# Patient Record
Sex: FEMALE | Race: WHITE | NOT HISPANIC OR LATINO | Employment: OTHER | URBAN - METROPOLITAN AREA
[De-identification: names, ages, dates, MRNs, and addresses within clinical notes are randomized per-mention and may not be internally consistent; named-entity substitution may affect disease eponyms.]

---

## 2017-01-04 PROBLEM — N20.0 NEPHROLITHIASIS: Status: ACTIVE | Noted: 2017-01-04

## 2017-03-10 PROBLEM — J45.20 MILD INTERMITTENT ASTHMA WITHOUT COMPLICATION: Status: ACTIVE | Noted: 2017-03-10

## 2017-09-10 ENCOUNTER — PATIENT MESSAGE (OUTPATIENT)
Dept: OBSTETRICS AND GYNECOLOGY | Facility: CLINIC | Age: 27
End: 2017-09-10

## 2018-01-29 PROBLEM — K21.9 GERD (GASTROESOPHAGEAL REFLUX DISEASE): Status: ACTIVE | Noted: 2018-01-29

## 2018-01-29 PROBLEM — N20.0 NEPHROLITHIASIS: Status: RESOLVED | Noted: 2017-01-04 | Resolved: 2018-01-29

## 2018-05-11 DIAGNOSIS — N64.4 BREAST PAIN: Primary | ICD-10-CM

## 2018-09-06 ENCOUNTER — TELEPHONE (OUTPATIENT)
Dept: PHARMACY | Facility: CLINIC | Age: 28
End: 2018-09-06

## 2018-09-06 PROBLEM — M47.812 CERVICAL FACET SYNDROME: Status: ACTIVE | Noted: 2018-09-06

## 2018-09-12 NOTE — TELEPHONE ENCOUNTER
DOCUMENTATION ONLY  The prior authorization request for Aimovig was denied by the patient's insurance.   Forwarded to the clinical pharmacist for review. ARR 11:43am

## 2018-09-21 ENCOUNTER — PATIENT MESSAGE (OUTPATIENT)
Dept: ADMINISTRATIVE | Facility: OTHER | Age: 28
End: 2018-09-21

## 2018-10-09 NOTE — TELEPHONE ENCOUNTER
DOCUMENTATION ONLY:  Appeal for Aimovig approved from 10/0+/2018 to 01/06/2019  Case ID: [id number]   Co-pay: $3.00  Patient Assistance IS NOT required  JUNO

## 2018-11-06 ENCOUNTER — TELEPHONE (OUTPATIENT)
Dept: PHARMACY | Facility: CLINIC | Age: 28
End: 2018-11-06

## 2018-11-08 ENCOUNTER — TELEPHONE (OUTPATIENT)
Dept: PHARMACY | Facility: CLINIC | Age: 28
End: 2018-11-08

## 2018-11-08 NOTE — TELEPHONE ENCOUNTER
Initial Aimovig consult declined on  . Aimovig 140mg will be shipped on  to arrive at patient's home on  via [employer]. $ 0 copay and permission to charge CC on file. Address confirmed. Confirmed 2 patient identifiers - name and . Therapy Appropriate.    --Injection experience: patient is existing to therapy      All questions answered and addressed to patients satisfaction. I will f/u with patient in 7-10 days from start, [facility] to contact patient in 3 weeks for refills.

## 2018-11-20 ENCOUNTER — TELEPHONE (OUTPATIENT)
Dept: PHARMACY | Facility: CLINIC | Age: 28
End: 2018-11-20

## 2018-11-20 NOTE — TELEPHONE ENCOUNTER
Called patient in regards to Aimovig f/u. No VM set up therefore unable to leave message. Will continue to attempt to reach patient.

## 2018-12-06 NOTE — TELEPHONE ENCOUNTER
Call attempt 3 for Aimovig refill and followup. VM not setup. Sent [employer]. Copay $0 at 004.

## 2018-12-11 NOTE — TELEPHONE ENCOUNTER
Submitted prior authorization request for Aimovig to insurance on 09/07 9:45am. ARR   Detail Level: Detailed Quality 47: Advance Care Plan: Advance Care Planning discussed and documented; advance care plan or surrogate decision maker documented in the medical record.

## 2018-12-27 ENCOUNTER — TELEPHONE (OUTPATIENT)
Dept: PHARMACY | Facility: CLINIC | Age: 28
End: 2018-12-27

## 2019-01-04 ENCOUNTER — TELEPHONE (OUTPATIENT)
Dept: PHARMACY | Facility: CLINIC | Age: 29
End: 2019-01-04

## 2019-01-04 RX ORDER — MIRTAZAPINE 15 MG/1
30 TABLET, FILM COATED ORAL NIGHTLY
COMMUNITY
End: 2019-02-21

## 2019-01-04 NOTE — TELEPHONE ENCOUNTER
During refill call, patient reported they started taking Remeron 15 mg nightly and no longer taking Trazadone 100 mg, Z-Olayinka, and Cipro 500 mg. Informed patient there are no drug interactions with the Remeron and the Aimovig. Patient verbalized understanding.    Jose Roberto Palmer, PharmD  Clinical Pharmacist   [facility]   P: [phone]

## 2019-01-04 NOTE — TELEPHONE ENCOUNTER
Patient called to refill Aimovig.  Patient confirmed she has 0 doses left, her next injection is 1/10.  Ship 1/8 for 1/9 delivery.  Copay $0.00  in 004.  Patient confirmed she started new meds, but nonew allergies, or health conditions.  Verified address.  Transferred her to [facility]

## 2019-01-31 ENCOUNTER — TELEPHONE (OUTPATIENT)
Dept: PHARMACY | Facility: CLINIC | Age: 29
End: 2019-01-31

## 2019-02-28 ENCOUNTER — TELEPHONE (OUTPATIENT)
Dept: PHARMACY | Facility: CLINIC | Age: 29
End: 2019-02-28

## 2019-04-02 ENCOUNTER — PATIENT OUTREACH (OUTPATIENT)
Dept: ADMINISTRATIVE | Facility: HOSPITAL | Age: 29
End: 2019-04-02

## 2019-04-02 ENCOUNTER — TELEPHONE (OUTPATIENT)
Dept: PHARMACY | Facility: CLINIC | Age: 29
End: 2019-04-02

## 2019-04-29 ENCOUNTER — TELEPHONE (OUTPATIENT)
Dept: PHARMACY | Facility: CLINIC | Age: 29
End: 2019-04-29

## 2019-05-28 ENCOUNTER — TELEPHONE (OUTPATIENT)
Dept: PHARMACY | Facility: CLINIC | Age: 29
End: 2019-05-28

## 2019-05-28 NOTE — TELEPHONE ENCOUNTER
RX call regarding Aimovig from [facility]. Patient reached-- name and  verified. Patient stated she was out of town when injections came in and she spoke with a pharmacist and she did not inject until . Next injection will not be due until . Will pend out accordingly.

## 2019-06-14 ENCOUNTER — TELEPHONE (OUTPATIENT)
Dept: PHARMACY | Facility: CLINIC | Age: 29
End: 2019-06-14

## 2019-07-02 ENCOUNTER — TELEPHONE (OUTPATIENT)
Dept: PHARMACY | Facility: CLINIC | Age: 29
End: 2019-07-02

## 2019-07-02 NOTE — TELEPHONE ENCOUNTER
[facility] Patient confirmed need of the refill. Will deliver via [employer] on  to arrive on  with patient consent. Copay $0.00 at 004. Address confirmed. Patient has 0 doses remaining (0 day supply)/ next injection due . Patient denies missed doses and no side effects.  No new medications/allergies/medical conditions. Labs are stable. No ER/Urgent care visits in past month. No Sharps container needed. Patient taking the medication as directed. Patient denies any further questions. Confirmed 2 patient identifiers - Name and .     Manjinder Mcrae, PharmD  Clinical Pharmacist  [facility]  P: [phone]

## 2019-07-10 NOTE — TELEPHONE ENCOUNTER
The patient called on call pharmacist 7/9 at 8:07pm to ask why her Aimovig was not delivered on 7/9. It appears when the shipment was set up in [facility], it was RTS through her insurance. We discussed that her last injection was on 6/21 therefore this month's injection should be due on 7/19. The patient stated she previously injected on the 10th of every month and would like to resume that dosing.    I apologized for the miscommunication with the original shipment and informed her we will ship her Aimovig today for her to receive tomorrow. $0 copay, address confirmed. She verbalized understanding and had no further questions/concerns.

## 2019-07-30 ENCOUNTER — TELEPHONE (OUTPATIENT)
Dept: PHARMACY | Facility: CLINIC | Age: 29
End: 2019-07-30

## 2019-07-31 NOTE — TELEPHONE ENCOUNTER
DOCUMENTATION ONLY:     Prior Authorization for Emgality submitted to patient's insurance company via fax @11:57am. EMERITA

## 2019-08-01 ENCOUNTER — TELEPHONE (OUTPATIENT)
Dept: PHARMACY | Facility: CLINIC | Age: 29
End: 2019-08-01

## 2019-08-01 NOTE — TELEPHONE ENCOUNTER
DOCUMENTATION ONLY:  Prior authorization for Emgality approved from 7/31/2019 to 7/31/2020    Case Id: [id number]    Co-pay: $0    Patient Assistance is not required. Forwarding to clinical pharmacist for consult and shipment.  EMERITA

## 2019-08-01 NOTE — TELEPHONE ENCOUNTER
Initial Emgality consult completed on 19. Emgality 240mg (loading dose) shipment is pending. $0.00 copay. Patient intends to start Emgality on  8/10 when due for next CGRP dose. Address confirmed. Confirmed 2 patient identifiers - name and . Therapy Appropriate.    Indication: Migraine prophlaxis  goals of treatment: reduction in migraine frequency, intensity, and/or duration.     --Injection experience: Yes  Informed patient on online injection video on  website - links sent via [employer] in refrigerator prior to use (do not freeze, do not shake, keep in original box until use).    Counseled patient on administration directions:  - Inject two injections (240mg) into the skin as a loading dose, followed by one injection (120mg) once every 4 weeks thereafter.   -  Advised patient to keep a calendar to stay compliant.   - Take out of the refrigerator 30 minutes prior to injection to reach room temperature.  - Wash hands before and after injection.  - Monthly RX will come with gauze, band aids, and alcohol swabs.  - Patient may self-inject in either the front/top of the thighs, abdomen- but at least 2 inches away from belly button   - If someone else is giving the injection, they may also use the outer part of your upper arm or your buttocks.   - If injecting 2 pens for the loading dose, use 2 different injection sites.   - Patient was instructed to rotate injections sites monthly.  - Inspect medication - should be clear and colorless to slightly yellow to slightly brown.   - Patient is to wipe down the injection site with the alcohol pad, wait to dry.    - Remove white base cap from pen (twist to remove).  - Place the pen flat against the injection site and turn the lock ring to the unlocked position then push down and hold the teal button - there will be an initial click; in 10 seconds you will hear a second click.  - Remove the pen from skin and check to see if the gray plunger is  visible - this will indicate that the injection is complete.   - Patient will use sharps container; once full, per state law, she/he may securely lock the sharps container and place in trash. Pharmacy will replace the sharps at no additional charge.    Patient was counseled on possible side effects:  - Injection site reaction: redness, soreness, itching, bruising, which should resolve within 3-5 days.  - Allergic reactions: itching, rash, hives, swelling of face, mouth, tongue, throat, trouble breathing.   - Informed that there is no data in pregnancy/breastfeeding.     Consultation included the importance of compliance and of keeping all follow up appointments.  Patient understands to report any medication changes to [facility] and provider. All questions answered and addressed to patients satisfaction. RPh will touchbase with pt in 7 days and [facility] will contact patient in 3 weeks for refills.    Tim Trviedi, PharmD  Clinical Pharmacist  [facility]  P: [phone]    InSean sent to provider on 8/1/19

## 2019-09-25 PROBLEM — R10.13 EPIGASTRIC PAIN: Status: ACTIVE | Noted: 2019-09-25

## 2019-09-25 PROBLEM — R19.7 DIARRHEA: Status: ACTIVE | Noted: 2019-09-25

## 2019-09-30 ENCOUNTER — TELEPHONE (OUTPATIENT)
Dept: PHARMACY | Facility: CLINIC | Age: 29
End: 2019-09-30

## 2019-10-31 ENCOUNTER — TELEPHONE (OUTPATIENT)
Dept: PHARMACY | Facility: CLINIC | Age: 29
End: 2019-10-31

## 2019-10-31 NOTE — TELEPHONE ENCOUNTER
Call attempt 1 for Emgality refill and follow-up. LVM for patient and sent [email or web address] message. $0.00 copay at 004.     Tim Trivedi, PharmD  Clinical Pharmacist  [facility]  P: [phone]

## 2019-11-04 ENCOUNTER — PATIENT MESSAGE (OUTPATIENT)
Dept: ORTHOPEDICS | Facility: CLINIC | Age: 29
End: 2019-11-04

## 2019-11-05 NOTE — TELEPHONE ENCOUNTER
Refill call for Emgality. Verified to ship on Wednesday 11/6 for delivery on Thursday 11/7. $0.00 copay at 004.     Tim Trivedi, PharmD  Clinical Pharmacist  [facility]  P: [phone]

## 2019-12-02 ENCOUNTER — TELEPHONE (OUTPATIENT)
Dept: PHARMACY | Facility: CLINIC | Age: 29
End: 2019-12-02

## 2019-12-02 PROBLEM — E04.2 NONTOXIC MULTINODULAR GOITER: Status: ACTIVE | Noted: 2019-12-02

## 2019-12-03 PROBLEM — Z83.3 FAMILY HISTORY OF DIABETES MELLITUS: Status: ACTIVE | Noted: 2019-12-03

## 2019-12-03 PROBLEM — Z83.49 FAMILY HISTORY OF THYROID DISEASE IN MOTHER: Status: ACTIVE | Noted: 2019-12-03

## 2019-12-27 ENCOUNTER — TELEPHONE (OUTPATIENT)
Dept: PHARMACY | Facility: CLINIC | Age: 29
End: 2019-12-27

## 2020-02-14 ENCOUNTER — TELEPHONE (OUTPATIENT)
Dept: PHARMACY | Facility: CLINIC | Age: 30
End: 2020-02-14

## 2020-02-18 ENCOUNTER — TELEPHONE (OUTPATIENT)
Dept: PHARMACY | Facility: CLINIC | Age: 30
End: 2020-02-18

## 2020-03-13 ENCOUNTER — TELEPHONE (OUTPATIENT)
Dept: PHARMACY | Facility: CLINIC | Age: 30
End: 2020-03-13

## 2020-07-22 PROBLEM — Z01.818 PRE-OP TESTING: Status: ACTIVE | Noted: 2020-07-22

## 2021-03-16 PROBLEM — G57.72 COMPLEX REGIONAL PAIN SYNDROME TYPE 2 OF LEFT LOWER EXTREMITY: Status: ACTIVE | Noted: 2021-03-16

## 2021-05-06 ENCOUNTER — PATIENT MESSAGE (OUTPATIENT)
Dept: RESEARCH | Facility: HOSPITAL | Age: 31
End: 2021-05-06

## 2022-03-08 ENCOUNTER — HOSPITAL ENCOUNTER (EMERGENCY)
Facility: HOSPITAL | Age: 32
Discharge: HOME | End: 2022-03-08
Attending: EMERGENCY MEDICINE
Payer: MEDICAID

## 2022-03-08 VITALS
SYSTOLIC BLOOD PRESSURE: 106 MMHG | TEMPERATURE: 99 F | RESPIRATION RATE: 18 BRPM | BODY MASS INDEX: 21.17 KG/M2 | HEIGHT: 64 IN | HEART RATE: 95 BPM | DIASTOLIC BLOOD PRESSURE: 84 MMHG | OXYGEN SATURATION: 98 % | WEIGHT: 124 LBS

## 2022-03-08 DIAGNOSIS — R10.13 EPIGASTRIC PAIN: Primary | ICD-10-CM

## 2022-03-08 PROCEDURE — 25000003 PHARM REV CODE 250: Performed by: EMERGENCY MEDICINE

## 2022-03-08 PROCEDURE — 96372 THER/PROPH/DIAG INJ SC/IM: CPT | Mod: 59 | Performed by: EMERGENCY MEDICINE

## 2022-03-08 PROCEDURE — 96365 THER/PROPH/DIAG IV INF INIT: CPT

## 2022-03-08 PROCEDURE — 99284 EMERGENCY DEPT VISIT MOD MDM: CPT | Mod: 25

## 2022-03-08 PROCEDURE — 63600175 PHARM REV CODE 636 W HCPCS: Performed by: EMERGENCY MEDICINE

## 2022-03-08 RX ORDER — DICYCLOMINE HYDROCHLORIDE 10 MG/ML
20 INJECTION INTRAMUSCULAR
Status: COMPLETED | OUTPATIENT
Start: 2022-03-08 | End: 2022-03-08

## 2022-03-08 RX ORDER — FAMOTIDINE 20 MG/1
20 TABLET, FILM COATED ORAL 2 TIMES DAILY PRN
Qty: 30 TABLET | Refills: 0 | Status: SHIPPED | OUTPATIENT
Start: 2022-03-08 | End: 2022-03-31 | Stop reason: SDUPTHER

## 2022-03-08 RX ORDER — FAMOTIDINE 10 MG/ML
20 INJECTION INTRAVENOUS
Status: COMPLETED | OUTPATIENT
Start: 2022-03-08 | End: 2022-03-08

## 2022-03-08 RX ORDER — MAG HYDROX/ALUMINUM HYD/SIMETH 200-200-20
30 SUSPENSION, ORAL (FINAL DOSE FORM) ORAL ONCE
Status: COMPLETED | OUTPATIENT
Start: 2022-03-08 | End: 2022-03-08

## 2022-03-08 RX ORDER — LIDOCAINE HYDROCHLORIDE 20 MG/ML
10 SOLUTION OROPHARYNGEAL ONCE
Status: COMPLETED | OUTPATIENT
Start: 2022-03-08 | End: 2022-03-08

## 2022-03-08 RX ADMIN — ALUMINUM HYDROXIDE, MAGNESIUM HYDROXIDE, AND SIMETHICONE 30 ML: 200; 200; 20 SUSPENSION ORAL at 07:03

## 2022-03-08 RX ADMIN — FAMOTIDINE 20 MG: 10 INJECTION INTRAVENOUS at 07:03

## 2022-03-08 RX ADMIN — DICYCLOMINE HYDROCHLORIDE 20 MG: 20 INJECTION, SOLUTION INTRAMUSCULAR at 08:03

## 2022-03-08 RX ADMIN — PROMETHAZINE HYDROCHLORIDE 25 MG: 25 INJECTION INTRAMUSCULAR; INTRAVENOUS at 07:03

## 2022-03-08 RX ADMIN — SODIUM CHLORIDE 500 ML: 0.9 INJECTION, SOLUTION INTRAVENOUS at 07:03

## 2022-03-08 RX ADMIN — LIDOCAINE HYDROCHLORIDE 10 ML: 20 SOLUTION ORAL; TOPICAL at 07:03

## 2022-03-09 NOTE — ED PROVIDER NOTES
"EMERGENCY DEPARTMENT HISTORY AND PHYSICAL EXAM          Date: 3/8/2022   Patient Name: Seema Myrick       History of Presenting Illness           Chief Complaint   Patient presents with    Abdominal Pain    Vomiting     Pt stated that she has a "twisted small intestine" and stated that after she ate boiled crawfish tonight she began vomiting and experiencing abdominal pain. Pt attempted to take spasm meds and phenergen however has vomited it up multiple times.          History Provided By: Patient    1846   Seema Myrick is a 31 y.o. female with PMHX of "twisted small intestine" on PPI, anxiety who presents to the emergency department C/O N/V.    Patient reports she has stomach issues and is followed by GI at [facility].  She reports she a some boiled crawfish earlier and about an hour so later began developing crampy abdominal pain as well as nausea and vomiting.  She took her antispasmodic medication and phenagren however she threw that up.  She is complaining of crampy upper abdominal pain as well as nausea.    Patient outpatient her records reveals she is followed by GI for GERD and esophagitis as well as IBS.  They report a tortuous upper GI tract    PCP: Kings French MD        Current Facility-Administered Medications   Medication Dose Route Frequency Provider Last Rate Last Admin    aluminum-magnesium hydroxide-simethicone 200-200-20 mg/5 mL suspension 30 mL  30 mL Oral Once Niles Pettit MD        And    LIDOcaine HCl 2% oral solution 10 mL  10 mL Oral Once Niles Pettit MD        famotidine (PF) injection 20 mg  20 mg Intravenous ED 1 Time Niles Pettit MD        promethazine (PHENERGAN) 25 mg in dextrose 5 % 50 mL IVPB  25 mg Intravenous ED 1 Time Niles Pettit MD        sodium chloride 0.9% bolus 500 mL  500 mL Intravenous ED 1 Time Niles Pettit MD         Current Outpatient Medications   Medication Sig Dispense Refill    AJOVY AUTOINJECTOR 225 " mg/1.5 mL autoinjector INJECT 1 PEN INJECTOR UNDER THE SKIN ONCE A MONTH      albuterol (PROVENTIL/VENTOLIN HFA) 90 mcg/actuation inhaler INHALE 2 PUFFS BY MOUTH EVERY 6 HOURS AS NEEDED FOR WHEEZING OR SHORTNESS OF BREATH 18 g 11    ALBUTEROL INHL Inhale into the lungs.      BABY ASPIRIN ORAL Take by mouth.      clotrimazole (LOTRIMIN) 1 % cream Apply topically 2 (two) times daily. 1 Tube 0    LORazepam (ATIVAN) 0.5 MG tablet Take 0.5 mg by mouth every 8 (eight) hours as needed for Anxiety.       meclizine (ANTIVERT) 25 mg tablet TAKE 1 TABLET(25 MG) BY MOUTH THREE TIMES DAILY AS NEEDED FOR DIZZINESS 90 tablet 5    meloxicam (MOBIC) 7.5 MG tablet Take 7.5 mg by mouth once daily.      methocarbamoL (ROBAXIN) 500 MG Tab Take 1 tablet (500 mg total) by mouth 3 (three) times daily. 90 tablet 3    mupirocin (BACTROBAN) 2 % ointment Apply topically 3 (three) times daily. 30 g 0    OSCIMIN SL 0.125 mg Subl TAKE 1 TABLET BY MOUTH EVERY 4 HOURS AS NEEDED FOR CRAMP LIKE PAIN 120 tablet 1    pantoprazole (PROTONIX) 40 MG tablet Take 1 tablet (40 mg total) by mouth 2 (two) times daily. 180 tablet 3    promethazine (PHENERGAN) 12.5 MG Tab Take 1 tablet (12.5 mg total) by mouth 4 (four) times daily. 120 tablet 2    rimegepant (NURTEC) 75 mg odt Take 75 mg by mouth once as needed for Migraine. Place ODT tablet on the tongue; alternatively the ODT tablet may be placed under the tongue      tamsulosin (FLOMAX) 0.4 mg Cap TAKE 1 CAPSULE(0.4 MG) BY MOUTH EVERY DAY 90 capsule 3    zolpidem (AMBIEN) 5 MG Tab Take 10 mg by mouth every evening.              Past History     Past Medical History:   Past Medical History:   Diagnosis Date    Anemia     Anxiety     Arthritis     states to lower back from falling in 2008    Asthma     Carpal tunnel syndrome     Fibrosis, breast     Gallstones     GERD (gastroesophageal reflux disease)     IBS (irritable bowel syndrome)     Kidney stones     Kidney stones      Malignant hyperthermia     age2    Migraine headache     PFO (patent foramen ovale)         Past Surgical History:   Past Surgical History:   Procedure Laterality Date    ADENOIDECTOMY      ARTHROSCOPY OF KNEE Left 8/7/2020    Procedure: ARTHROSCOPY, KNEE;  Surgeon: Mal Ayala MD;  Location: [facility];  Service: Orthopedics;  Laterality: Left;    CARPAL TUNNEL RELEASE Right 10/29/2019    Procedure: RELEASE, CARPAL TUNNEL;  Surgeon: Mal Ayala MD;  Location: [facility] OR;  Service: Orthopedics;  Laterality: Right;    CHOLECYSTECTOMY      ESOPHAGOGASTRODUODENOSCOPY N/A 12/5/2019    Procedure: EGD (ESOPHAGOGASTRODUODENOSCOPY);  Surgeon: Claudio Enriquez MD;  [facility];  Service: Endoscopy;  Laterality: N/A;    injections to neck      LITHOTRIPSY      TONSILLECTOMY      TYMPANOSTOMY TUBE PLACEMENT          Family History:   Family History   Problem Relation Age of Onset    Thyroid disease Mother     Carpal tunnel syndrome Mother     Cholecystitis Father     Kidney disease Father     Gout Father     No Known Problems Paternal Grandmother     No Known Problems Paternal Grandfather     No Known Problems Maternal Grandmother     Brain cancer Maternal Grandfather     Brain cancer Paternal Aunt         Social History:   Social History     Tobacco Use    Smoking status: Never Smoker    Smokeless tobacco: Never Used   Substance Use Topics    Alcohol use: No    Drug use: No        Allergies:   Review of patient's allergies indicates:   Allergen Reactions    Halothane Other (See Comments)     Malignant Hyperthermia    Mucinex [guaifenesin] Other (See Comments)     Asthma attack     Succinylcholine Other (See Comments)     Malignant Hyperthermia    Amoxil [amoxicillin] Other (See Comments)     Makes my stomach hurt    Bactrim [sulfamethoxazole-trimethoprim] Nausea And Vomiting    Compazine [prochlorperazine edisylate]     Doxycycline     Ibuprofen      Chest pains    Neurontin  "[gabapentin] Other (See Comments)     Patient reports if makes her whole body numb    Succinylcholine chloride      Other reaction(s): Unknown    Toradol [ketorolac]      MIGRAINES    Tramadol      MIGRAINES    Trintellix [vortioxetine]      "PASS OUT AND CHEST PAIN"    Zofran [ondansetron hcl (pf)] Other (See Comments)     States: "It gives me migraines"       Brompheniramine-pseudoeph-dm Rash     Other reaction(s): Unknown  Other reaction(s): Unknown    Capzasin [capsaicin] Rash    Levaquin [levofloxacin] Rash          Review of Systems   Review of Systems   Constitutional: Negative for chills and fever.   Respiratory: Negative for shortness of breath.    Gastrointestinal: Positive for abdominal pain, nausea and vomiting. Negative for diarrhea.   All other systems reviewed and are negative.               Physical Exam     Vitals:    03/08/22 1833 03/08/22 1835   BP: 106/84    BP Location: Right arm    Patient Position: Sitting    Pulse: 95    Resp: 18    Temp: 98.7 °F (37.1 °C)    TempSrc: Oral    SpO2: 98%    Weight:  56.2 kg (124 lb)   Height:  5' 4" (1.626 m)      Physical Exam  Vitals and nursing note reviewed.   Constitutional:       General: She is not in acute distress.     Appearance: Normal appearance. She is not ill-appearing.   HENT:      Head: Normocephalic and atraumatic.      Nose: Nose normal. No congestion or rhinorrhea.      Mouth/Throat:      Mouth: Mucous membranes are moist.   Eyes:      Extraocular Movements: Extraocular movements intact.      Pupils: Pupils are equal, round, and reactive to light.   Cardiovascular:      Rate and Rhythm: Normal rate and regular rhythm.      Heart sounds: Normal heart sounds. No murmur heard.  Pulmonary:      Effort: Pulmonary effort is normal. No respiratory distress.   Abdominal:      General: Abdomen is flat. Bowel sounds are normal.      Palpations: Abdomen is soft.      Tenderness: There is no abdominal tenderness. There is no guarding or rebound. "   Musculoskeletal:         General: No tenderness or deformity. Normal range of motion.      Cervical back: Normal range of motion.   Skin:     General: Skin is warm and dry.   Neurological:      General: No focal deficit present.      Mental Status: She is alert and oriented to person, place, and time. Mental status is at baseline.   Psychiatric:         Mood and Affect: Mood normal.         Behavior: Behavior normal.              Diagnostic Study Results      Labs -   No results found for this or any previous visit (from the past 12 hour(s)).     Radiologic Studies -    No orders to display        Medications given in the ED-   Medications   sodium chloride 0.9% bolus 500 mL (has no administration in time range)   promethazine (PHENERGAN) 25 mg in dextrose 5 % 50 mL IVPB (has no administration in time range)   famotidine (PF) injection 20 mg (has no administration in time range)   aluminum-magnesium hydroxide-simethicone 200-200-20 mg/5 mL suspension 30 mL (has no administration in time range)     And   LIDOcaine HCl 2% oral solution 10 mL (has no administration in time range)           Medical Decision Making    I am the first provider for this patient.     I reviewed the vital signs, available nursing notes, past medical history, past surgical history, family history and social history.     Vital Signs:  Reviewed the patient's vital signs.     Pulse Oximetry Analysis and Interpretation:    98% on Room Air, normal        Records Reviewed: Old medical records.  Nursing notes.        Provider Notes (Medical Decision Making): Seema Myrick is a 31 y.o. female here with epigastric pain nausea vomiting.  Patient not actively vomiting in the emergency department.  Symptoms started after eating spicy crawfish.  She has a history of gastritis.      Procedures:   Procedures      ED Course:    8:13 PM  Patient reports continued abdominal pain will give dose of bentyl    9:07 PM  Patient reports some improvement of her  symptoms still having some pain.  No episodes of vomiting in the emergency department.  Advised brat diet and other strategies for gastritis.  Recommend H2 blocker as needed.  Advised follow-up with her GI.  Return precautions given if patient continues to have pain or develops new concerning symptoms.           Diagnosis and Disposition     Critical Care:      DISCHARGE NOTE:       Seema Myrick's  results have been reviewed with her.  She has been counseled regarding her diagnosis, treatment, and plan.  She verbally conveys understanding and agreement of the signs, symptoms, diagnosis, treatment and prognosis and additionally agrees to follow up as discussed.  She also agrees with the care-plan and conveys that all of her questions have been answered.  I have also provided discharge instructions for her that include: educational information regarding their diagnosis and treatment, and list of reasons why they would want to return to the ED prior to their follow-up appointment, should her condition change. She has been provided with education for proper emergency department utilization.         CLINICAL IMPRESSION:      No diagnosis found.          PLAN:   1. Discharge Home  2.      Medication List      ASK your doctor about these medications    AJOVY AUTOINJECTOR 225 mg/1.5 mL autoinjector  Generic drug: fremanezumab-vfrm     albuterol 90 mcg/actuation inhaler  Commonly known as: PROVENTIL/VENTOLIN HFA  INHALE 2 PUFFS BY MOUTH EVERY 6 HOURS AS NEEDED FOR WHEEZING OR SHORTNESS OF BREATH     ALBUTEROL INHL     BABY ASPIRIN ORAL     clotrimazole 1 % cream  Commonly known as: LOTRIMIN  Apply topically 2 (two) times daily.     LORazepam 0.5 MG tablet  Commonly known as: ATIVAN     meclizine 25 mg tablet  Commonly known as: ANTIVERT  TAKE 1 TABLET(25 MG) BY MOUTH THREE TIMES DAILY AS NEEDED FOR DIZZINESS     meloxicam 7.5 MG tablet  Commonly known as: MOBIC     methocarbamoL 500 MG Tab  Commonly known as:  ROBAXIN  Take 1 tablet (500 mg total) by mouth 3 (three) times daily.     mupirocin 2 % ointment  Commonly known as: BACTROBAN  Apply topically 3 (three) times daily.     NURTEC 75 mg odt  Generic drug: rimegepant     OSCIMIN SL 0.125 mg Subl  Generic drug: hyoscyamine  TAKE 1 TABLET BY MOUTH EVERY 4 HOURS AS NEEDED FOR CRAMP LIKE PAIN     pantoprazole 40 MG tablet  Commonly known as: PROTONIX  Take 1 tablet (40 mg total) by mouth 2 (two) times daily.     promethazine 12.5 MG Tab  Commonly known as: PHENERGAN  Take 1 tablet (12.5 mg total) by mouth 4 (four) times daily.     tamsulosin 0.4 mg Cap  Commonly known as: FLOMAX  TAKE 1 CAPSULE(0.4 MG) BY MOUTH EVERY DAY     zolpidem 5 MG Tab  Commonly known as: AMBIEN           3. No follow-up provider specified.     _______________________________     Please note that this dictation was completed with [employer], the computer voice recognition software.  Quite often unanticipated grammatical, syntax, homophones, and other interpretive errors are inadvertently transcribed by the computer software.  Please disregard these errors.  Please excuse any errors that have escaped final proofreading.             Niles Pettit MD  03/08/22 0314

## 2022-03-23 ENCOUNTER — HOSPITAL ENCOUNTER (EMERGENCY)
Facility: HOSPITAL | Age: 32
Discharge: HOME | End: 2022-03-24
Attending: EMERGENCY MEDICINE
Payer: MEDICAID

## 2022-03-23 DIAGNOSIS — M79.2 NEUROPATHIC PAIN: ICD-10-CM

## 2022-03-23 DIAGNOSIS — R60.9 SWELLING: ICD-10-CM

## 2022-03-23 DIAGNOSIS — M79.605 PAIN OF LEFT LOWER EXTREMITY: Primary | ICD-10-CM

## 2022-03-23 PROCEDURE — 99285 EMERGENCY DEPT VISIT HI MDM: CPT | Mod: 25

## 2022-03-23 PROCEDURE — 25000003 PHARM REV CODE 250: Performed by: EMERGENCY MEDICINE

## 2022-03-23 RX ORDER — HYDROCODONE BITARTRATE AND ACETAMINOPHEN 5; 325 MG/1; MG/1
1 TABLET ORAL
Status: COMPLETED | OUTPATIENT
Start: 2022-03-23 | End: 2022-03-23

## 2022-03-23 RX ADMIN — HYDROCODONE BITARTRATE AND ACETAMINOPHEN 1 TABLET: 5; 325 TABLET ORAL at 10:03

## 2022-03-24 VITALS
DIASTOLIC BLOOD PRESSURE: 76 MMHG | TEMPERATURE: 98 F | BODY MASS INDEX: 21.28 KG/M2 | RESPIRATION RATE: 20 BRPM | HEART RATE: 96 BPM | SYSTOLIC BLOOD PRESSURE: 113 MMHG | WEIGHT: 124 LBS | OXYGEN SATURATION: 99 %

## 2022-03-24 PROCEDURE — 63600175 PHARM REV CODE 636 W HCPCS: Performed by: EMERGENCY MEDICINE

## 2022-03-24 PROCEDURE — 96372 THER/PROPH/DIAG INJ SC/IM: CPT | Performed by: EMERGENCY MEDICINE

## 2022-03-24 RX ORDER — PROMETHAZINE HYDROCHLORIDE 25 MG/ML
12.5 INJECTION, SOLUTION INTRAMUSCULAR; INTRAVENOUS
Status: COMPLETED | OUTPATIENT
Start: 2022-03-24 | End: 2022-03-24

## 2022-03-24 RX ORDER — MORPHINE SULFATE 4 MG/ML
4 INJECTION, SOLUTION INTRAMUSCULAR; INTRAVENOUS
Status: COMPLETED | OUTPATIENT
Start: 2022-03-24 | End: 2022-03-24

## 2022-03-24 RX ADMIN — PROMETHAZINE HYDROCHLORIDE 12.5 MG: 25 INJECTION INTRAMUSCULAR; INTRAVENOUS at 12:03

## 2022-03-24 RX ADMIN — MORPHINE SULFATE 4 MG: 4 INJECTION INTRAVENOUS at 12:03

## 2022-03-24 NOTE — ED PROVIDER NOTES
"Encounter Date: 3/23/2022       History     Chief Complaint   Patient presents with    Joint Swelling     Pt stated she had surgery on her knee 2 years ago. Pt is c/o pain and swelling in her left knee. Pt has a Dr appt tomorrow but stated she can not take the pain anymore.      Patient is a 32-year-old female with past medical history significant for amenorrhea at of medication allergies as well as multiple surgeries including most recently a left knee arthroscopy which she states since, has had intermittent lower extremity swelling, pain and intermittent color change, purple.  Patient states that tonight, the pain got significantly worse        Review of patient's allergies indicates:   Allergen Reactions    Halothane Other (See Comments)     Malignant Hyperthermia    Mucinex [guaifenesin] Other (See Comments)     Asthma attack     Succinylcholine Other (See Comments)     Malignant Hyperthermia    Amoxil [amoxicillin] Other (See Comments)     Makes my stomach hurt    Bactrim [sulfamethoxazole-trimethoprim] Nausea And Vomiting    Compazine [prochlorperazine edisylate]     Doxycycline     Ibuprofen      Chest pains    Neurontin [gabapentin] Other (See Comments)     Patient reports if makes her whole body numb    Succinylcholine chloride      Other reaction(s): Unknown    Toradol [ketorolac]      MIGRAINES    Tramadol      MIGRAINES    Trintellix [vortioxetine]      "PASS OUT AND CHEST PAIN"    Zofran [ondansetron hcl (pf)] Other (See Comments)     States: "It gives me migraines"       Brompheniramine-pseudoeph-dm Rash     Other reaction(s): Unknown  Other reaction(s): Unknown    Capzasin [capsaicin] Rash    Levaquin [levofloxacin] Rash     Past Medical History:   Diagnosis Date    Anemia     Anxiety     Arthritis     states to lower back from falling in 2008    Asthma     Carpal tunnel syndrome     Fibrosis, breast     Gallstones     GERD (gastroesophageal reflux disease)     IBS " (irritable bowel syndrome)     Kidney stones     Kidney stones     Malignant hyperthermia     age1    Migraine headache     PFO (patent foramen ovale)      Past Surgical History:   Procedure Laterality Date    ADENOIDECTOMY      ARTHROSCOPY OF KNEE Left 8/7/2020    Procedure: ARTHROSCOPY, KNEE;  Surgeon: Mal Ayala MD;  Location: [facility];  Service: Orthopedics;  Laterality: Left;    CARPAL TUNNEL RELEASE Right 10/29/2019    Procedure: RELEASE, CARPAL TUNNEL;  Surgeon: Mal Ayala MD;  Location: [facility];  Service: Orthopedics;  Laterality: Right;    CHOLECYSTECTOMY      ESOPHAGOGASTRODUODENOSCOPY N/A 12/5/2019    Procedure: EGD (ESOPHAGOGASTRODUODENOSCOPY);  Surgeon: Claudoi Enriquez MD;  Location: [facility];  Service: Endoscopy;  Laterality: N/A;    injections to neck      LITHOTRIPSY      TONSILLECTOMY      TYMPANOSTOMY TUBE PLACEMENT       Family History   Problem Relation Age of Onset    Thyroid disease Mother     Carpal tunnel syndrome Mother     Cholecystitis Father     Kidney disease Father     Gout Father     No Known Problems Paternal Grandmother     No Known Problems Paternal Grandfather     No Known Problems Maternal Grandmother     Brain cancer Maternal Grandfather     Brain cancer Paternal Aunt      Social History     Tobacco Use    Smoking status: Never Smoker    Smokeless tobacco: Never Used   Substance Use Topics    Alcohol use: No    Drug use: No     Review of Systems   Constitutional: Negative for chills, diaphoresis, fatigue and fever.   HENT: Negative for congestion.    Respiratory: Negative for cough, chest tightness, shortness of breath and stridor.    Cardiovascular: Negative for chest pain and palpitations.   Gastrointestinal: Negative for abdominal pain, diarrhea, nausea and vomiting.   Genitourinary: Negative for dysuria, flank pain, frequency, hematuria and pelvic pain.   Musculoskeletal: Positive for joint swelling. Negative for back pain and neck  pain.   Skin: Positive for color change.   Neurological: Negative for dizziness and headaches.   All other systems reviewed and are negative.      Physical Exam     Initial Vitals [03/23/22 2235]   BP Pulse Resp Temp SpO2   113/76 96 14 98.3 °F (36.8 °C) 99 %      MAP       --         Physical Exam    Nursing note and vitals reviewed.  Constitutional: She appears well-developed and well-nourished. She is not diaphoretic. No distress.   HENT:   Head: Normocephalic and atraumatic.   Neck: Neck supple.   Normal range of motion.  Cardiovascular: Normal rate and intact distal pulses.   Pulmonary/Chest: Breath sounds normal. No respiratory distress.   Abdominal: Abdomen is soft. There is no abdominal tenderness.   Musculoskeletal:         General: Tenderness present.      Cervical back: Normal range of motion and neck supple.      Comments: Pain to left lower extremity diffusely     Neurological: She is alert and oriented to person, place, and time.   Skin: Skin is warm. No rash and no abscess noted. No erythema. No pallor.         ED Course   Procedures  Labs Reviewed - No data to display       Imaging Results          US Lower Extremity Veins Left (In process)  Result time 03/23/22 23:18:25                 Medications   HYDROcodone-acetaminophen 5-325 mg per tablet 1 tablet (1 tablet Oral Given 3/23/22 2240)   morphine injection 4 mg (4 mg Intramuscular Given 3/24/22 0022)   promethazine injection 12.5 mg (12.5 mg Intramuscular Given 3/24/22 0032)                 ED Course as of 03/24/22 0055   Thu Mar 24, 2022   0054 Ultrasounds negative for DVT.  Pulses intact distally and patient much more comfortable after the IM medications.  Vital stable.  Patient has an appointment today at 1:20 a.m. with orthopedics.  Will discharge [RB]      ED Course User Index  [RB] Lopez Griffith MD             Clinical Impression:   Final diagnoses:  [R60.9] Swelling  [M79.605] Pain of left lower extremity (Primary)  [M79.2] Neuropathic  pain          ED Disposition Condition    Discharge Stable        ED Prescriptions     None        Follow-up Information     Follow up With Specialties Details Why Contact Info    Orthopedics  Go today             Lopez Griffith MD  03/24/22 7907

## 2022-04-20 ENCOUNTER — HOSPITAL ENCOUNTER (EMERGENCY)
Facility: HOSPITAL | Age: 32
Discharge: HOME | End: 2022-04-20
Attending: EMERGENCY MEDICINE
Payer: MEDICAID

## 2022-04-20 VITALS
OXYGEN SATURATION: 99 % | HEART RATE: 85 BPM | RESPIRATION RATE: 18 BRPM | WEIGHT: 119.38 LBS | DIASTOLIC BLOOD PRESSURE: 88 MMHG | HEIGHT: 64 IN | BODY MASS INDEX: 20.38 KG/M2 | TEMPERATURE: 99 F | SYSTOLIC BLOOD PRESSURE: 112 MMHG

## 2022-04-20 DIAGNOSIS — M25.512 PAIN IN JOINT OF LEFT SHOULDER: ICD-10-CM

## 2022-04-20 DIAGNOSIS — R20.2 PARESTHESIAS: Primary | ICD-10-CM

## 2022-04-20 PROCEDURE — 99283 EMERGENCY DEPT VISIT LOW MDM: CPT

## 2022-04-20 PROCEDURE — 25000003 PHARM REV CODE 250: Performed by: EMERGENCY MEDICINE

## 2022-04-20 RX ORDER — HYDROCODONE BITARTRATE AND ACETAMINOPHEN 5; 325 MG/1; MG/1
1 TABLET ORAL
Status: COMPLETED | OUTPATIENT
Start: 2022-04-20 | End: 2022-04-20

## 2022-04-20 RX ADMIN — HYDROCODONE BITARTRATE AND ACETAMINOPHEN 1 TABLET: 5; 325 TABLET ORAL at 05:04

## 2022-04-21 NOTE — ED PROVIDER NOTES
EMERGENCY DEPARTMENT HISTORY AND PHYSICAL EXAM          Date: 4/20/2022   Patient Name: Seema Myrick       History of Presenting Illness           Chief Complaint   Patient presents with    Numbness     Complains of numbness to left side from head to toe. Pt states this started after she was given contrast IV medication for bone scan this morning.          History Provided By: Patient and Partner      Seema Myrick is a 31 y.o. female with PMHX of PFO, anxiety, kidney stones, cholecystectomy, IBS, complex pain syndrome left lower extremity, left knee arthroscopy 8/2020 who presents to the emergency department C/O left side pain.    Patient reports left-sided pain and numbness.  She states he symptoms have been ongoing since  this afternoon.  Patient reports earlier this morning she had a bone scan to evaluate for her chronic left lower extremity pain.    She states that while lying on the table for the scan she started having worsening left-sided body pain.  She says that pain was made worse and she was not allowed to move or reposition herself.  She says this can took a long time.    She comes late this afternoon complaining of left-sided numbness and pain.  She says this pain is chronic but now it is acutely worse.  She reports pain mainly left shoulder as well as left lower extremity.    She denies facial symptoms, she denies headache, she denies difficulty speaking, and changes in vision.    Patient says she is upset and tired of being in pain.    She says she was directed by her doctor's office to go to ER due to concern of allergic reaction and was told it would take days for the contrast to metabolize in her body.    PCP: Kings French MD        No current facility-administered medications for this encounter.     Current Outpatient Medications   Medication Sig Dispense Refill    AJOVY AUTOINJECTOR 225 mg/1.5 mL autoinjector INJECT 1 PEN INJECTOR UNDER THE SKIN ONCE A MONTH      albuterol  (PROVENTIL) 2.5 mg /3 mL (0.083 %) nebulizer solution TAKE 3 ML BY NEBULIZATION EVERY 6 HOURS AS NEEDED FOR WHEEZE OR SHORTNESS OF BREATH 180 mL 11    albuterol (PROVENTIL/VENTOLIN HFA) 90 mcg/actuation inhaler INHALE 2 PUFFS BY MOUTH EVERY 6 HOURS AS NEEDED FOR WHEEZING OR SHORTNESS OF BREATH 18 g 11    ALBUTEROL INHL Inhale into the lungs.      BABY ASPIRIN ORAL Take by mouth.      clotrimazole (LOTRIMIN) 1 % cream Apply topically 2 (two) times daily. 1 Tube 0    famotidine (PEPCID) 20 MG tablet Take 1 tablet (20 mg total) by mouth 2 (two) times daily as needed for Heartburn. 60 tablet 5    LORazepam (ATIVAN) 0.5 MG tablet Take 0.5 mg by mouth every 8 (eight) hours as needed for Anxiety.       meclizine (ANTIVERT) 25 mg tablet TAKE 1 TABLET(25 MG) BY MOUTH THREE TIMES DAILY AS NEEDED FOR DIZZINESS 90 tablet 5    meloxicam (MOBIC) 7.5 MG tablet Take 7.5 mg by mouth once daily.      methocarbamoL (ROBAXIN) 500 MG Tab Take 1 tablet (500 mg total) by mouth 3 (three) times daily. 90 tablet 3    mupirocin (BACTROBAN) 2 % ointment Apply topically 3 (three) times daily. 30 g 0    OSCIMIN SL 0.125 mg Subl TAKE 1 TABLET BY MOUTH EVERY 4 HOURS AS NEEDED FOR CRAMP LIKE PAIN 120 tablet 1    pantoprazole (PROTONIX) 40 MG tablet Take 1 tablet (40 mg total) by mouth 2 (two) times daily. 180 tablet 3    promethazine (PHENERGAN) 12.5 MG Tab Take 1 tablet (12.5 mg total) by mouth 4 (four) times daily. 120 tablet 2    rimegepant (NURTEC) 75 mg odt Take 75 mg by mouth once as needed for Migraine. Place ODT tablet on the tongue; alternatively the ODT tablet may be placed under the tongue      tamsulosin (FLOMAX) 0.4 mg Cap TAKE 1 CAPSULE(0.4 MG) BY MOUTH EVERY DAY 90 capsule 3    zolpidem (AMBIEN) 5 MG Tab Take 10 mg by mouth every evening.              Past History     Past Medical History:   Past Medical History:   Diagnosis Date    Anemia     Anxiety     Arthritis     states to lower back from falling in 2008     Asthma     Carpal tunnel syndrome     Fibrosis, breast     Gallstones     GERD (gastroesophageal reflux disease)     IBS (irritable bowel syndrome)     Kidney stones     Kidney stones     Malignant hyperthermia     age1    Migraine headache     PFO (patent foramen ovale)         Past Surgical History:   Past Surgical History:   Procedure Laterality Date    ADENOIDECTOMY      ARTHROSCOPY OF KNEE Left 8/7/2020    Procedure: ARTHROSCOPY, KNEE;  Surgeon: Mal Ayala MD;  Location: [facility];  Service: Orthopedics;  Laterality: Left;    CARPAL TUNNEL RELEASE Right 10/29/2019    Procedure: RELEASE, CARPAL TUNNEL;  Surgeon: Mal Ayala MD;  Location: [facility];  Service: Orthopedics;  Laterality: Right;    CHOLECYSTECTOMY      ESOPHAGOGASTRODUODENOSCOPY N/A 12/5/2019    Procedure: EGD (ESOPHAGOGASTRODUODENOSCOPY);  Surgeon: Claudio Enriquez MD;  [facility]  Service: Endoscopy;  Laterality: N/A;    injections to neck      LITHOTRIPSY      TONSILLECTOMY      TYMPANOSTOMY TUBE PLACEMENT          Family History:   Family History   Problem Relation Age of Onset    Thyroid disease Mother     Carpal tunnel syndrome Mother     Cholecystitis Father     Kidney disease Father     Gout Father     No Known Problems Paternal Grandmother     No Known Problems Paternal Grandfather     No Known Problems Maternal Grandmother     Brain cancer Maternal Grandfather     Brain cancer Paternal Aunt         Social History:   Social History     Tobacco Use    Smoking status: Never Smoker    Smokeless tobacco: Never Used   Substance Use Topics    Alcohol use: No    Drug use: No        Allergies:   Review of patient's allergies indicates:   Allergen Reactions    Halothane Other (See Comments)     Malignant Hyperthermia    Mucinex [guaifenesin] Other (See Comments)     Asthma attack     Succinylcholine Other (See Comments)     Malignant Hyperthermia    Amoxil [amoxicillin] Other (See Comments)      "Makes my stomach hurt    Bactrim [sulfamethoxazole-trimethoprim] Nausea And Vomiting    Compazine [prochlorperazine edisylate]     Doxycycline     Ibuprofen      Chest pains    Neurontin [gabapentin] Other (See Comments)     Patient reports if makes her whole body numb    Succinylcholine chloride      Other reaction(s): Unknown    Toradol [ketorolac]      MIGRAINES    Tramadol      MIGRAINES    Trintellix [vortioxetine]      "PASS OUT AND CHEST PAIN"    Zofran [ondansetron hcl (pf)] Other (See Comments)     States: "It gives me migraines"       Brompheniramine-pseudoeph-dm Rash     Other reaction(s): Unknown  Other reaction(s): Unknown    Capzasin [capsaicin] Rash    Levaquin [levofloxacin] Rash          Review of Systems   Review of Systems   Constitutional: Negative for chills and fever.   Gastrointestinal: Negative for nausea and vomiting.   Musculoskeletal: Positive for arthralgias, back pain and myalgias.   Neurological: Positive for weakness and numbness. Negative for dizziness, seizures, facial asymmetry, speech difficulty and headaches.   All other systems reviewed and are negative.               Physical Exam     Vitals:    04/20/22 1722 04/20/22 1758 04/20/22 1800   BP: 112/88     BP Location: Right arm     Patient Position: Sitting     Pulse: 85     Resp: 18 18    Temp: 98.7 °F (37.1 °C)     TempSrc: Oral     SpO2: 99%     Weight: 54.2 kg (119 lb 6.4 oz)     Height:   5' 4" (1.626 m)      Physical Exam  Vitals and nursing note reviewed.   Constitutional:       General: She is not in acute distress.     Appearance: Normal appearance. She is not ill-appearing.      Comments: Sitting up on bed, able to reposition lying backwards during exam under own power   HENT:      Head: Normocephalic and atraumatic.      Nose: Nose normal. No congestion or rhinorrhea.      Mouth/Throat:      Mouth: Mucous membranes are moist.   Eyes:      General: No visual field deficit.     Extraocular Movements: " Extraocular movements intact.      Pupils: Pupils are equal, round, and reactive to light.   Cardiovascular:      Rate and Rhythm: Regular rhythm.   Pulmonary:      Effort: Pulmonary effort is normal. No respiratory distress.   Musculoskeletal:         General: No deformity.      Left shoulder: Tenderness present. Decreased range of motion (pain with extension, abduction).      Cervical back: Normal range of motion.   Skin:     General: Skin is warm and dry.   Neurological:      General: No focal deficit present.      Mental Status: She is alert and oriented to person, place, and time. Mental status is at baseline.      Cranial Nerves: Cranial nerves are intact. No cranial nerve deficit or dysarthria.      Sensory: Sensory deficit (reported entire LUE, LLE, and left side paresthesias) present.      Motor: Motor function is intact. No weakness, tremor or abnormal muscle tone.      Coordination: Coordination is intact. Finger-Nose-Finger Test and Heel to Shin Test normal.      Gait: Gait is intact.   Psychiatric:         Mood and Affect: Mood normal.         Behavior: Behavior normal.              Diagnostic Study Results      Labs -   No results found for this or any previous visit (from the past 12 hour(s)).     Radiologic Studies -    No orders to display        Medications given in the ED-   Medications   HYDROcodone-acetaminophen 5-325 mg per tablet 1 tablet (1 tablet Oral Given 4/20/22 1758)           Medical Decision Making    I am the first provider for this patient.     I reviewed the vital signs, available nursing notes, past medical history, past surgical history, family history and social history.     Vital Signs:  Reviewed the patient's vital signs.     Pulse Oximetry Analysis and Interpretation:    99% on Room Air, normal      Records Reviewed: Old medical records.  Nursing notes.        Provider Notes (Medical Decision Making): Seema Myrick is a 31 y.o. female here for left-sided body pain and  paresthesias.  She reports concerns about her PFO causing stroke as well as allergic reaction to tracer used in bone scan.  Patient is not presenting either as acute CVA or allergic reaction.  She reports acute on chronic left-sided body pain has a diagnosis of complex regional pain syndrome of her left lower extremity. She tells me she always has body pain on her left sided extremities and especially her LLE. She reports difficulty with movement of her LUE which she attributes to left shoulder pain.  She reports symptoms are exacerbated by lying on scan table for over an hour during her study this morning.  He is complaining of body pain.  She has no hard neurological deficits.  She has no shortness of breath, erythema, hives, or GI upset to suggest a allergic reaction to radionucleotide tracer.  I have advised patient follow-up with her primary care doctor.  Discussed reasons to return to the ER including slurred speech, facial droop, weakness, and changes in vision.    Procedures:   Procedures      ED Course:               Diagnosis and Disposition     Critical Care:      DISCHARGE NOTE:       Seema Myrick's  results have been reviewed with her.  She has been counseled regarding her diagnosis, treatment, and plan.  She verbally conveys understanding and agreement of the signs, symptoms, diagnosis, treatment and prognosis and additionally agrees to follow up as discussed.  She also agrees with the care-plan and conveys that all of her questions have been answered.  I have also provided discharge instructions for her that include: educational information regarding their diagnosis and treatment, and list of reasons why they would want to return to the ED prior to their follow-up appointment, should her condition change. She has been provided with education for proper emergency department utilization.         CLINICAL IMPRESSION:         1. Paresthesias    2. Pain in joint of left shoulder              PLAN:    1. Discharge Home  2.      Medication List      ASK your doctor about these medications    AJOVY AUTOINJECTOR 225 mg/1.5 mL autoinjector  Generic drug: [id number]-vfrm     * albuterol 90 mcg/actuation inhaler  Commonly known as: PROVENTIL/VENTOLIN HFA  INHALE 2 PUFFS BY MOUTH EVERY 6 HOURS AS NEEDED FOR WHEEZING OR SHORTNESS OF BREATH     * albuterol 2.5 mg /3 mL (0.083 %) nebulizer solution  Commonly known as: PROVENTIL  TAKE 3 ML BY NEBULIZATION EVERY 6 HOURS AS NEEDED FOR WHEEZE OR SHORTNESS OF BREATH     ALBUTEROL INHL     BABY ASPIRIN ORAL     clotrimazole 1 % cream  Commonly known as: LOTRIMIN  Apply topically 2 (two) times daily.     famotidine 20 MG tablet  Commonly known as: PEPCID  Take 1 tablet (20 mg total) by mouth 2 (two) times daily as needed for Heartburn.     LORazepam 0.5 MG tablet  Commonly known as: ATIVAN     meclizine 25 mg tablet  Commonly known as: ANTIVERT  TAKE 1 TABLET(25 MG) BY MOUTH THREE TIMES DAILY AS NEEDED FOR DIZZINESS     meloxicam 7.5 MG tablet  Commonly known as: MOBIC     methocarbamoL 500 MG Tab  Commonly known as: ROBAXIN  Take 1 tablet (500 mg total) by mouth 3 (three) times daily.     mupirocin 2 % ointment  Commonly known as: BACTROBAN  Apply topically 3 (three) times daily.     NURTEC 75 mg odt  Generic drug: rimegepant     OSCIMIN SL 0.125 mg Subl  Generic drug: hyoscyamine  TAKE 1 TABLET BY MOUTH EVERY 4 HOURS AS NEEDED FOR CRAMP LIKE PAIN     pantoprazole 40 MG tablet  Commonly known as: PROTONIX  Take 1 tablet (40 mg total) by mouth 2 (two) times daily.     promethazine 12.5 MG Tab  Commonly known as: PHENERGAN  Take 1 tablet (12.5 mg total) by mouth 4 (four) times daily.     tamsulosin 0.4 mg Cap  Commonly known as: FLOMAX  TAKE 1 CAPSULE(0.4 MG) BY MOUTH EVERY DAY     zolpidem 5 MG Tab  Commonly known as: AMBIEN         * This list has 2 medication(s) that are the same as other medications prescribed for you. Read the directions carefully, and ask your doctor or  other care provider to review them with you.               3. Kings French MD  [address]  [place] [address]  [phone]    Schedule an appointment as soon as possible for a visit   Primary care follow up    [facility] Emergency Department  [address] [place] [id number] [phone]  Go to   If symptoms worsen       _______________________________     Please note that this dictation was completed with [employer], the computer voice recognition software.  Quite often unanticipated grammatical, syntax, homophones, and other interpretive errors are inadvertently transcribed by the computer software.  Please disregard these errors.  Please excuse any errors that have escaped final proofreading.             Niles Pettit MD  04/21/22 0633       Niles Pettit MD  04/21/22 0633

## 2022-04-28 ENCOUNTER — HOSPITAL ENCOUNTER (EMERGENCY)
Facility: HOSPITAL | Age: 32
Discharge: HOME | End: 2022-04-28
Attending: EMERGENCY MEDICINE
Payer: MEDICAID

## 2022-04-28 VITALS
OXYGEN SATURATION: 100 % | DIASTOLIC BLOOD PRESSURE: 91 MMHG | HEART RATE: 107 BPM | TEMPERATURE: 98 F | WEIGHT: 110 LBS | HEIGHT: 64 IN | SYSTOLIC BLOOD PRESSURE: 111 MMHG | BODY MASS INDEX: 18.78 KG/M2 | RESPIRATION RATE: 16 BRPM

## 2022-04-28 DIAGNOSIS — G89.4 CHRONIC PAIN SYNDROME: Primary | ICD-10-CM

## 2022-04-28 LAB
B-HCG UR QL: NEGATIVE
BILIRUB UR QL STRIP: NEGATIVE
CLARITY UR: CLEAR
COLOR UR: YELLOW
GLUCOSE UR QL STRIP: NEGATIVE
HGB UR QL STRIP: NEGATIVE
KETONES UR QL STRIP: ABNORMAL
LEUKOCYTE ESTERASE UR QL STRIP: NEGATIVE
NITRITE UR QL STRIP: NEGATIVE
PH UR STRIP: 7 [PH] (ref 5–8)
PROT UR QL STRIP: NEGATIVE
SP GR UR STRIP: 1.02 (ref 1–1.03)
URN SPEC COLLECT METH UR: ABNORMAL
UROBILINOGEN UR STRIP-ACNC: 1 EU/DL

## 2022-04-28 PROCEDURE — 63600175 PHARM REV CODE 636 W HCPCS: Performed by: EMERGENCY MEDICINE

## 2022-04-28 PROCEDURE — 99284 EMERGENCY DEPT VISIT MOD MDM: CPT | Mod: 25

## 2022-04-28 PROCEDURE — 81003 URINALYSIS AUTO W/O SCOPE: CPT | Performed by: EMERGENCY MEDICINE

## 2022-04-28 PROCEDURE — 81025 URINE PREGNANCY TEST: CPT | Performed by: EMERGENCY MEDICINE

## 2022-04-28 PROCEDURE — 96372 THER/PROPH/DIAG INJ SC/IM: CPT | Performed by: EMERGENCY MEDICINE

## 2022-04-28 RX ORDER — PROMETHAZINE HYDROCHLORIDE 25 MG/ML
25 INJECTION, SOLUTION INTRAMUSCULAR; INTRAVENOUS
Status: COMPLETED | OUTPATIENT
Start: 2022-04-28 | End: 2022-04-28

## 2022-04-28 RX ORDER — FREMANEZUMAB-VFRM 225 MG/1.5ML
225 INJECTION SUBCUTANEOUS
COMMUNITY
End: 2022-08-31 | Stop reason: SDUPTHER

## 2022-04-28 RX ORDER — MORPHINE SULFATE 4 MG/ML
4 INJECTION, SOLUTION INTRAMUSCULAR; INTRAVENOUS
Status: COMPLETED | OUTPATIENT
Start: 2022-04-28 | End: 2022-04-28

## 2022-04-28 RX ADMIN — MORPHINE SULFATE 4 MG: 4 INJECTION INTRAVENOUS at 05:04

## 2022-04-28 RX ADMIN — PROMETHAZINE HYDROCHLORIDE 25 MG: 25 INJECTION INTRAMUSCULAR; INTRAVENOUS at 05:04

## 2022-04-28 NOTE — ED PROVIDER NOTES
"Encounter Date: 4/28/2022       History     Chief Complaint   Patient presents with    Leg Pain     Pt c/o pain in her left leg. Pt stated it hurts all of the way down her leg. Pt also c/o intermittent vaginal bleeding for the last 10 days. Pt denied any injury.      Patient is a 31-year-old female with past medical history significant for chronic left leg pain ever since a an E surgery who presents emergency department with continued pain.  Patient states she is seeing a physician for this but was unable to see the today due to patient's physician cancelling.  Patient states she has been getting injections and trying multiple different times medications, all of which she claims are not working.        Review of patient's allergies indicates:   Allergen Reactions    Halothane Other (See Comments)     Malignant Hyperthermia    Mucinex [guaifenesin] Other (See Comments)     Asthma attack     Succinylcholine Other (See Comments)     Malignant Hyperthermia    Amoxil [amoxicillin] Other (See Comments)     Makes my stomach hurt    Bactrim [sulfamethoxazole-trimethoprim] Nausea And Vomiting    Compazine [prochlorperazine edisylate]     Doxycycline     Ibuprofen      Chest pains    Neurontin [gabapentin] Other (See Comments)     Patient reports if makes her whole body numb    Succinylcholine chloride      Other reaction(s): Unknown    Toradol [ketorolac]      MIGRAINES    Tramadol      MIGRAINES    Trintellix [vortioxetine]      "PASS OUT AND CHEST PAIN"    Zofran [ondansetron hcl (pf)] Other (See Comments)     States: "It gives me migraines"       Brompheniramine-pseudoeph-dm Rash     Other reaction(s): Unknown  Other reaction(s): Unknown    Capzasin [capsaicin] Rash    Levaquin [levofloxacin] Rash     Past Medical History:   Diagnosis Date    Anemia     Anxiety     Arthritis     states to lower back from falling in 2008    Asthma     Carpal tunnel syndrome     Carpal tunnel syndrome on right     " Fibrosis, breast     Gallstones     GERD (gastroesophageal reflux disease)     IBS (irritable bowel syndrome)     Kidney stones     Kidney stones     Malignant hyperthermia     age1    Migraine headache     Osteoporosis     PFO (patent foramen ovale)     PFO (patent foramen ovale)     not flowing correctly (size 5)    Small intestine disorder     twisted     Past Surgical History:   Procedure Laterality Date    ADENOIDECTOMY      ADENOIDECTOMY      ARTHROSCOPY OF KNEE Left 8/7/2020    Procedure: ARTHROSCOPY, KNEE;  Surgeon: Mal Ayala MD;  Location: [facility];  Service: Orthopedics;  Laterality: Left;    CARPAL TUNNEL RELEASE Right 10/29/2019    Procedure: RELEASE, CARPAL TUNNEL;  Surgeon: Mal Ayala MD;  Location: [facility] OR;  Service: Orthopedics;  Laterality: Right;    CHOLECYSTECTOMY      ESOPHAGOGASTRODUODENOSCOPY N/A 12/5/2019    Procedure: EGD (ESOPHAGOGASTRODUODENOSCOPY);  Surgeon: Claudio Enriquez MD;  Location: [facility];  Service: Endoscopy;  Laterality: N/A;    injections to neck      LITHOTRIPSY      TONSILLECTOMY      TYMPANOSTOMY TUBE PLACEMENT       Family History   Problem Relation Age of Onset    Thyroid disease Mother     Carpal tunnel syndrome Mother     Cholecystitis Father     Kidney disease Father     Gout Father     No Known Problems Paternal Grandmother     No Known Problems Paternal Grandfather     No Known Problems Maternal Grandmother     Brain cancer Maternal Grandfather     Brain cancer Paternal Aunt      Social History     Tobacco Use    Smoking status: Never Smoker    Smokeless tobacco: Never Used   Substance Use Topics    Alcohol use: No    Drug use: No     Review of Systems   Constitutional: Negative for chills, diaphoresis, fatigue and fever.   HENT: Negative for congestion.    Respiratory: Negative for cough, chest tightness, shortness of breath and stridor.    Cardiovascular: Negative for chest pain and palpitations.   Gastrointestinal:  Negative for abdominal pain, diarrhea, nausea and vomiting.   Genitourinary: Negative for dysuria.   Musculoskeletal: Positive for myalgias. Negative for back pain and neck pain.   Neurological: Negative for dizziness and headaches.   All other systems reviewed and are negative.      Physical Exam     Initial Vitals [04/28/22 1622]   BP Pulse Resp Temp SpO2   (!) 111/91 107 14 98.1 °F (36.7 °C) 100 %      MAP       --         Physical Exam    Nursing note and vitals reviewed.  Constitutional: She appears well-developed and well-nourished. She is not diaphoretic. No distress.   HENT:   Head: Normocephalic and atraumatic.   Neck: Neck supple.   Normal range of motion.  Cardiovascular: Regular rhythm.   Pulmonary/Chest: No respiratory distress.   Abdominal: Abdomen is soft. There is no abdominal tenderness.   Musculoskeletal:         General: Tenderness present.      Cervical back: Normal range of motion and neck supple.      Comments: Neuropathic type pain all down the left lower extremity, dorsalis pedis pulse 2 +.     Neurological: She is alert and oriented to person, place, and time. She has normal strength. GCS score is 15. GCS eye subscore is 4. GCS verbal subscore is 5. GCS motor subscore is 6.   Skin: Skin is warm and dry.         ED Course   Procedures  Labs Reviewed   URINALYSIS - Abnormal; Notable for the following components:       Result Value    Ketones, UA 2+ (*)     All other components within normal limits   PREGNANCY TEST, URINE RAPID    Narrative:     Specimen Source->Urine          Imaging Results    None          Medications   morphine injection 4 mg (4 mg Intramuscular Given 4/28/22 1746)   promethazine injection 25 mg (25 mg Intramuscular Given 4/28/22 1747)                 ED Course as of 06/07/22 0836   Thu Apr 28, 2022 1749 Chronic pain, will treat with but patient claims helped on her last visit which is injectable morphine and Phenergan and discharge home to continue follow-up outpatient.  [RB]      ED Course User Index  [RB] Lopez Griffith MD             Clinical Impression:   Final diagnoses:  [G89.4] Chronic pain syndrome (Primary)          ED Disposition Condition    Discharge Stable        ED Prescriptions     None        Follow-up Information     Follow up With Specialties Details Why Contact Info    Kings French MD Family Medicine Call in 1 day  [address] [facility]  [place] [address]  [phone]             Lopez Griffith MD  04/28/22 1750       Lopez Griffith MD  06/07/22 0836

## 2022-05-19 ENCOUNTER — OFFICE VISIT (OUTPATIENT)
Dept: PRIMARY CARE CLINIC | Facility: CLINIC | Age: 32
End: 2022-05-19
Payer: MEDICAID

## 2022-05-19 ENCOUNTER — LAB VISIT (OUTPATIENT)
Dept: LAB | Facility: HOSPITAL | Age: 32
End: 2022-05-19
Attending: STUDENT IN AN ORGANIZED HEALTH CARE EDUCATION/TRAINING PROGRAM
Payer: MEDICAID

## 2022-05-19 VITALS
OXYGEN SATURATION: 98 % | HEART RATE: 81 BPM | BODY MASS INDEX: 19.14 KG/M2 | WEIGHT: 112.13 LBS | TEMPERATURE: 98 F | SYSTOLIC BLOOD PRESSURE: 108 MMHG | DIASTOLIC BLOOD PRESSURE: 68 MMHG | HEIGHT: 64 IN

## 2022-05-19 DIAGNOSIS — Z13.6 ENCOUNTER FOR LIPID SCREENING FOR CARDIOVASCULAR DISEASE: ICD-10-CM

## 2022-05-19 DIAGNOSIS — K21.9 GASTROESOPHAGEAL REFLUX DISEASE, UNSPECIFIED WHETHER ESOPHAGITIS PRESENT: ICD-10-CM

## 2022-05-19 DIAGNOSIS — J45.20 MILD INTERMITTENT ASTHMA WITHOUT COMPLICATION: ICD-10-CM

## 2022-05-19 DIAGNOSIS — K63.9 SMALL BOWEL DISEASE: ICD-10-CM

## 2022-05-19 DIAGNOSIS — Z13.220 ENCOUNTER FOR LIPID SCREENING FOR CARDIOVASCULAR DISEASE: ICD-10-CM

## 2022-05-19 DIAGNOSIS — K21.9 GASTROESOPHAGEAL REFLUX DISEASE, UNSPECIFIED WHETHER ESOPHAGITIS PRESENT: Primary | ICD-10-CM

## 2022-05-19 DIAGNOSIS — J45.909 ASTHMA, UNSPECIFIED ASTHMA SEVERITY, UNSPECIFIED WHETHER COMPLICATED, UNSPECIFIED WHETHER PERSISTENT: ICD-10-CM

## 2022-05-19 DIAGNOSIS — R42 VERTIGO: ICD-10-CM

## 2022-05-19 DIAGNOSIS — R11.2 NAUSEA AND VOMITING, INTRACTABILITY OF VOMITING NOT SPECIFIED, UNSPECIFIED VOMITING TYPE: ICD-10-CM

## 2022-05-19 DIAGNOSIS — Q21.12 PFO (PATENT FORAMEN OVALE): ICD-10-CM

## 2022-05-19 LAB
ALBUMIN SERPL BCP-MCNC: 3.9 G/DL (ref 3.5–5.2)
ALP SERPL-CCNC: 66 U/L (ref 55–135)
ALT SERPL W/O P-5'-P-CCNC: 21 U/L (ref 10–44)
ANION GAP SERPL CALC-SCNC: 3 MMOL/L (ref 8–16)
AST SERPL-CCNC: 13 U/L (ref 10–40)
BASOPHILS # BLD AUTO: 0.04 K/UL (ref 0–0.2)
BASOPHILS NFR BLD: 0.4 % (ref 0–1.9)
BILIRUB SERPL-MCNC: 0.5 MG/DL (ref 0.1–1)
BUN SERPL-MCNC: 16 MG/DL (ref 6–20)
CALCIUM SERPL-MCNC: 9 MG/DL (ref 8.7–10.5)
CHLORIDE SERPL-SCNC: 109 MMOL/L (ref 95–110)
CHOLEST SERPL-MCNC: 185 MG/DL (ref 120–199)
CHOLEST/HDLC SERPL: 2.6 {RATIO} (ref 2–5)
CO2 SERPL-SCNC: 27 MMOL/L (ref 23–29)
CREAT SERPL-MCNC: 0.9 MG/DL (ref 0.5–1.4)
DIFFERENTIAL METHOD: ABNORMAL
EOSINOPHIL # BLD AUTO: 0.2 K/UL (ref 0–0.5)
EOSINOPHIL NFR BLD: 1.7 % (ref 0–8)
ERYTHROCYTE [DISTWIDTH] IN BLOOD BY AUTOMATED COUNT: 12.8 % (ref 11.5–14.5)
EST. GFR  (AFRICAN AMERICAN): >60 ML/MIN/1.73 M^2
EST. GFR  (NON AFRICAN AMERICAN): >60 ML/MIN/1.73 M^2
GLUCOSE SERPL-MCNC: 95 MG/DL (ref 70–110)
HCT VFR BLD AUTO: 41.7 % (ref 37–48.5)
HDLC SERPL-MCNC: 70 MG/DL (ref 40–75)
HDLC SERPL: 37.8 % (ref 20–50)
HGB BLD-MCNC: 13.7 G/DL (ref 12–16)
IMM GRANULOCYTES # BLD AUTO: 0.1 K/UL (ref 0–0.04)
IMM GRANULOCYTES NFR BLD AUTO: 1.1 % (ref 0–0.5)
LDLC SERPL CALC-MCNC: 103 MG/DL (ref 63–159)
LYMPHOCYTES # BLD AUTO: 2.3 K/UL (ref 1–4.8)
LYMPHOCYTES NFR BLD: 25.9 % (ref 18–48)
MCH RBC QN AUTO: 30.6 PG (ref 27–31)
MCHC RBC AUTO-ENTMCNC: 32.9 G/DL (ref 32–36)
MCV RBC AUTO: 93 FL (ref 82–98)
MONOCYTES # BLD AUTO: 0.8 K/UL (ref 0.3–1)
MONOCYTES NFR BLD: 8.6 % (ref 4–15)
NEUTROPHILS # BLD AUTO: 5.6 K/UL (ref 1.8–7.7)
NEUTROPHILS NFR BLD: 62.3 % (ref 38–73)
NONHDLC SERPL-MCNC: 115 MG/DL
NRBC BLD-RTO: 0 /100 WBC
PLATELET # BLD AUTO: 269 K/UL (ref 150–450)
PMV BLD AUTO: 9.2 FL (ref 9.2–12.9)
POTASSIUM SERPL-SCNC: 3.8 MMOL/L (ref 3.5–5.1)
PROT SERPL-MCNC: 8 G/DL (ref 6–8.4)
RBC # BLD AUTO: 4.48 M/UL (ref 4–5.4)
SODIUM SERPL-SCNC: 139 MMOL/L (ref 136–145)
TRIGL SERPL-MCNC: 60 MG/DL (ref 30–150)
WBC # BLD AUTO: 8.93 K/UL (ref 3.9–12.7)

## 2022-05-19 PROCEDURE — 1159F PR MEDICATION LIST DOCUMENTED IN MEDICAL RECORD: ICD-10-PCS | Mod: CPTII,,, | Performed by: STUDENT IN AN ORGANIZED HEALTH CARE EDUCATION/TRAINING PROGRAM

## 2022-05-19 PROCEDURE — 36415 COLL VENOUS BLD VENIPUNCTURE: CPT | Performed by: STUDENT IN AN ORGANIZED HEALTH CARE EDUCATION/TRAINING PROGRAM

## 2022-05-19 PROCEDURE — 1160F PR REVIEW ALL MEDS BY PRESCRIBER/CLIN PHARMACIST DOCUMENTED: ICD-10-PCS | Mod: CPTII,,, | Performed by: STUDENT IN AN ORGANIZED HEALTH CARE EDUCATION/TRAINING PROGRAM

## 2022-05-19 PROCEDURE — 3074F PR MOST RECENT SYSTOLIC BLOOD PRESSURE < 130 MM HG: ICD-10-PCS | Mod: CPTII,,, | Performed by: STUDENT IN AN ORGANIZED HEALTH CARE EDUCATION/TRAINING PROGRAM

## 2022-05-19 PROCEDURE — 99204 PR OFFICE/OUTPT VISIT, NEW, LEVL IV, 45-59 MIN: ICD-10-PCS | Mod: S$PBB,,, | Performed by: STUDENT IN AN ORGANIZED HEALTH CARE EDUCATION/TRAINING PROGRAM

## 2022-05-19 PROCEDURE — 99204 OFFICE O/P NEW MOD 45 MIN: CPT | Mod: S$PBB,,, | Performed by: STUDENT IN AN ORGANIZED HEALTH CARE EDUCATION/TRAINING PROGRAM

## 2022-05-19 PROCEDURE — 99215 OFFICE O/P EST HI 40 MIN: CPT | Mod: PBBFAC,PN | Performed by: STUDENT IN AN ORGANIZED HEALTH CARE EDUCATION/TRAINING PROGRAM

## 2022-05-19 PROCEDURE — 3074F SYST BP LT 130 MM HG: CPT | Mod: CPTII,,, | Performed by: STUDENT IN AN ORGANIZED HEALTH CARE EDUCATION/TRAINING PROGRAM

## 2022-05-19 PROCEDURE — 3078F PR MOST RECENT DIASTOLIC BLOOD PRESSURE < 80 MM HG: ICD-10-PCS | Mod: CPTII,,, | Performed by: STUDENT IN AN ORGANIZED HEALTH CARE EDUCATION/TRAINING PROGRAM

## 2022-05-19 PROCEDURE — 99999 PR PBB SHADOW E&M-EST. PATIENT-LVL V: ICD-10-PCS | Mod: PBBFAC,,, | Performed by: STUDENT IN AN ORGANIZED HEALTH CARE EDUCATION/TRAINING PROGRAM

## 2022-05-19 PROCEDURE — 99999 PR PBB SHADOW E&M-EST. PATIENT-LVL V: CPT | Mod: PBBFAC,,, | Performed by: STUDENT IN AN ORGANIZED HEALTH CARE EDUCATION/TRAINING PROGRAM

## 2022-05-19 PROCEDURE — 3008F BODY MASS INDEX DOCD: CPT | Mod: CPTII,,, | Performed by: STUDENT IN AN ORGANIZED HEALTH CARE EDUCATION/TRAINING PROGRAM

## 2022-05-19 PROCEDURE — 3008F PR BODY MASS INDEX (BMI) DOCUMENTED: ICD-10-PCS | Mod: CPTII,,, | Performed by: STUDENT IN AN ORGANIZED HEALTH CARE EDUCATION/TRAINING PROGRAM

## 2022-05-19 PROCEDURE — 85025 COMPLETE CBC W/AUTO DIFF WBC: CPT | Performed by: STUDENT IN AN ORGANIZED HEALTH CARE EDUCATION/TRAINING PROGRAM

## 2022-05-19 PROCEDURE — 3078F DIAST BP <80 MM HG: CPT | Mod: CPTII,,, | Performed by: STUDENT IN AN ORGANIZED HEALTH CARE EDUCATION/TRAINING PROGRAM

## 2022-05-19 PROCEDURE — 80053 COMPREHEN METABOLIC PANEL: CPT | Performed by: STUDENT IN AN ORGANIZED HEALTH CARE EDUCATION/TRAINING PROGRAM

## 2022-05-19 PROCEDURE — 1159F MED LIST DOCD IN RCRD: CPT | Mod: CPTII,,, | Performed by: STUDENT IN AN ORGANIZED HEALTH CARE EDUCATION/TRAINING PROGRAM

## 2022-05-19 PROCEDURE — 1160F RVW MEDS BY RX/DR IN RCRD: CPT | Mod: CPTII,,, | Performed by: STUDENT IN AN ORGANIZED HEALTH CARE EDUCATION/TRAINING PROGRAM

## 2022-05-19 PROCEDURE — 80061 LIPID PANEL: CPT | Performed by: STUDENT IN AN ORGANIZED HEALTH CARE EDUCATION/TRAINING PROGRAM

## 2022-05-19 RX ORDER — TIZANIDINE 4 MG/1
4 TABLET ORAL EVERY 6 HOURS PRN
COMMUNITY
End: 2024-02-09

## 2022-05-19 RX ORDER — MECLIZINE HYDROCHLORIDE 25 MG/1
TABLET ORAL
Qty: 90 TABLET | Refills: 5 | Status: SHIPPED | OUTPATIENT
Start: 2022-05-19

## 2022-05-19 RX ORDER — ALBUTEROL SULFATE 0.83 MG/ML
SOLUTION RESPIRATORY (INHALATION)
Qty: 180 ML | Refills: 11 | Status: SHIPPED | OUTPATIENT
Start: 2022-05-19 | End: 2022-06-20

## 2022-05-19 NOTE — PROGRESS NOTES
[facility] Note    Chief Complaint      Chief Complaint   Patient presents with    Establish Care       History of Present Illness      Seema Myrick is a 31 y.o. female who presents today for Establish Care  Past medical history of asthma, GERD, anxiety, nephrolithiasis, cholecystectomy, IBS, complex pain syndrome affecting left lower extremity, left knee arthroscopy 8/2020.    Asthma: Needs refill on albuterol. Current maintenance with one albuterol neb treatment daily first thing in the morning.    GI: After each meal, patient feels like vomiting. When she vomits, it is generally of undigested food. She takes Phenergan prior to eating, but sometimes ends of vomiting that as well. The abdominal pain is located, pointing over epigastric area and she wakes up in the middle of the morning to burning, aching pain, where sometimes she cannot move. Pain can be eased with Phenergan, but after 2-3 hours the pain cycles continue. Due to anorexia, patient has lost weight from 166 to 112 pounds since 2018. Experiencing cyclical overflow diarrhea. Due to intolerance to many food items, she has been taking smaller frequent meals, avoid all food items containing lactose and gluten. Daily metamucil ended up causing to have more weight. She has been taking protonix 40 mg BID for over 2 years and pepcid 20 mg BID was added to protonix one month ago. Patient endorses minimal improvement to pain or ability to take in food.   Last EGD 12/2019 showed normal esophagus, normal stomach, normal duodenal mucosa with significant tortuosity to distal stomach and small bowel disease.   Concerning for obstructive pattern will obtain small bowel follow through and refer patient to general surgery for possible repeat EGD given worsening PO intake, persistent nausea, vomiting and epigastric pain.     PFO: per patient she has had bubble test through her neurologist, who manages her migraine headaches. States she has not had  further evaluation by cardiology.   Denies any recent intractable headaches, dizziness, vision changes, chest pain, palpitations, shortness of breath.     Follow up medical records from neurology    Past Medical History:  Past Medical History:   Diagnosis Date    Anemia     Anxiety     Arthritis     states to lower back from falling in 2008    Asthma     Carpal tunnel syndrome     Carpal tunnel syndrome on right     Fibrosis, breast     Gallstones     GERD (gastroesophageal reflux disease)     IBS (irritable bowel syndrome)     Kidney stones     Kidney stones     Malignant hyperthermia     age1    Migraine headache     Osteoporosis     PFO (patent foramen ovale)     PFO (patent foramen ovale)     not flowing correctly (size 5)    Small intestine disorder     twisted     Past Surgical History:   has a past surgical history that includes Tonsillectomy; Tympanostomy tube placement; Cholecystectomy; Adenoidectomy; Lithotripsy; Carpal tunnel release (Right, 10/29/2019); Esophagogastroduodenoscopy (N/A, 12/5/2019); Arthroscopy of knee (Left, 8/7/2020); injections to neck; and Adenoidectomy.    Family History:  family history includes Brain cancer in her maternal grandfather and paternal aunt; Carpal tunnel syndrome in her mother; Cholecystitis in her father; Gout in her father; Kidney disease in her father; No Known Problems in her maternal grandmother, paternal grandfather, and paternal grandmother; Thyroid disease in her mother.     Social History:  Social History     Tobacco Use    Smoking status: Never Smoker    Smokeless tobacco: Never Used   Substance Use Topics    Alcohol use: No    Drug use: No     I personally reviewed all past medical, surgical, social and family history.    Review of Systems   Constitutional: Negative for chills, fever, malaise/fatigue and weight loss.   HENT: Negative for congestion, ear discharge, ear pain, sinus pain and sore throat.    Eyes: Negative for blurred  vision, pain, discharge and redness.   Respiratory: Negative for cough, hemoptysis, sputum production, shortness of breath, wheezing and stridor.    Cardiovascular: Negative for chest pain, palpitations and leg swelling.   Gastrointestinal: Negative for abdominal pain, blood in stool, constipation, diarrhea, nausea and vomiting.   Genitourinary: Negative for dysuria, frequency and hematuria.   Musculoskeletal: Negative for back pain, falls, joint pain, myalgias and neck pain.   Skin: Negative for rash.   Neurological: Negative for dizziness, tingling, focal weakness, seizures, weakness and headaches.   Endo/Heme/Allergies: Negative for environmental allergies and polydipsia. Does not bruise/bleed easily.   Psychiatric/Behavioral: Negative for depression and suicidal ideas. The patient is not nervous/anxious.       Medications:  Outpatient Encounter Medications as of 5/19/2022   Medication Sig Dispense Refill    AJOVY AUTOINJECTOR 225 mg/1.5 mL autoinjector INJECT 1 PEN INJECTOR UNDER THE SKIN ONCE A MONTH      albuterol (PROVENTIL/VENTOLIN HFA) 90 mcg/actuation inhaler INHALE 2 PUFFS BY MOUTH EVERY 6 HOURS AS NEEDED FOR WHEEZING OR SHORTNESS OF BREATH 18 g 11    ALBUTEROL INHL Inhale into the lungs.      BABY ASPIRIN ORAL Take by mouth.      brivaracetam (BRIVIACT) 50 mg Tab Take 50 mg by mouth 2 (two) times daily.      famotidine (PEPCID) 20 MG tablet Take 1 tablet (20 mg total) by mouth 2 (two) times daily as needed for Heartburn. 60 tablet 5    fremanezumab-vfrm (AJOVY AUTOINJECTOR) 225 mg/1.5 mL autoinjector Inject 225 mg into the skin every 28 days.      LORazepam (ATIVAN) 0.5 MG tablet Take 0.5 mg by mouth every 8 (eight) hours as needed for Anxiety.       OSCIMIN SL 0.125 mg Subl TAKE 1 TABLET BY MOUTH EVERY 4 HOURS AS NEEDED FOR CRAMP LIKE PAIN 120 tablet 1    pantoprazole (PROTONIX) 40 MG tablet Take 1 tablet (40 mg total) by mouth 2 (two) times daily. 180 tablet 3    promethazine (PHENERGAN)  12.5 MG Tab Take 1 tablet (12.5 mg total) by mouth 4 (four) times daily. 120 tablet 2    rimegepant (NURTEC) 75 mg odt Take 75 mg by mouth once as needed for Migraine. Place ODT tablet on the tongue; alternatively the ODT tablet may be placed under the tongue      tamsulosin (FLOMAX) 0.4 mg Cap TAKE 1 CAPSULE(0.4 MG) BY MOUTH EVERY DAY 90 capsule 3    tiZANidine (ZANAFLEX) 4 MG tablet Take 4 mg by mouth every 6 (six) hours as needed (take on mon, wed, fri).      [DISCONTINUED] albuterol (PROVENTIL) 2.5 mg /3 mL (0.083 %) nebulizer solution TAKE 3 ML BY NEBULIZATION EVERY 6 HOURS AS NEEDED FOR WHEEZE OR SHORTNESS OF BREATH 180 mL 11    [DISCONTINUED] meclizine (ANTIVERT) 25 mg tablet TAKE 1 TABLET(25 MG) BY MOUTH THREE TIMES DAILY AS NEEDED FOR DIZZINESS 90 tablet 5    albuterol (PROVENTIL) 2.5 mg /3 mL (0.083 %) nebulizer solution TAKE 3 ML BY NEBULIZATION EVERY 6 HOURS AS NEEDED FOR WHEEZE OR SHORTNESS OF BREATH 180 mL 11    meclizine (ANTIVERT) 25 mg tablet TAKE 1 TABLET(25 MG) BY MOUTH THREE TIMES DAILY AS NEEDED FOR DIZZINESS 90 tablet 5    zolpidem (AMBIEN) 5 MG Tab Take 10 mg by mouth every evening.       [DISCONTINUED] clotrimazole (LOTRIMIN) 1 % cream Apply topically 2 (two) times daily. (Patient not taking: Reported on 5/19/2022) 1 Tube 0    [DISCONTINUED] meloxicam (MOBIC) 7.5 MG tablet Take 7.5 mg by mouth once daily.      [DISCONTINUED] methocarbamoL (ROBAXIN) 500 MG Tab Take 1 tablet (500 mg total) by mouth 3 (three) times daily. (Patient not taking: Reported on 5/19/2022) 90 tablet 3    [DISCONTINUED] mupirocin (BACTROBAN) 2 % ointment Apply topically 3 (three) times daily. 30 g 0     No facility-administered encounter medications on file as of 5/19/2022.     Allergies:  Review of patient's allergies indicates:   Allergen Reactions    Halothane Other (See Comments)     Malignant Hyperthermia    Mucinex [guaifenesin] Other (See Comments)     Asthma attack     Succinylcholine Other (See  "Comments)     Malignant Hyperthermia    Amoxil [amoxicillin] Other (See Comments)     Makes my stomach hurt    Bactrim [sulfamethoxazole-trimethoprim] Nausea And Vomiting    Compazine [prochlorperazine edisylate]     Doxycycline     Ibuprofen      Chest pains    Neurontin [gabapentin] Other (See Comments)     Patient reports if makes her whole body numb    Succinylcholine chloride      Other reaction(s): Unknown    Toradol [ketorolac]      MIGRAINES    Tramadol      MIGRAINES    Trintellix [vortioxetine]      "PASS OUT AND CHEST PAIN"    Zofran [ondansetron hcl (pf)] Other (See Comments)     States: "It gives me migraines"       Brompheniramine-pseudoeph-dm Rash     Other reaction(s): Unknown  Other reaction(s): Unknown    Capzasin [capsaicin] Rash    Levaquin [levofloxacin] Rash     Health Maintenance:  Immunization History   Administered Date(s) Administered    DTaP 1989, 1989, 02/05/1990, 02/27/1991, 06/28/1994    HIB 02/27/1991    HPV Quadrivalent 02/13/2008, 04/17/2008, 08/25/2008    Hepatitis B 04/13/2000, 05/15/2000, 10/10/2000    IPV 1989, 1989, 02/27/1991, 06/28/1994    Influenza 01/29/2008    MMR 02/27/1991, 06/28/1994    Meningococcal Conjugate (MCV4P) 04/07/2006    Td (ADULT) 08/15/2003, 10/20/2004    Tdap 09/27/2017      Health Maintenance   Topic Date Due    TETANUS VACCINE  09/27/2027    Hepatitis C Screening  Completed    Lipid Panel  Completed     Health Maintenance Topics with due status: Not Due       Topic Last Completion Date    Influenza Vaccine 01/29/2008    TETANUS VACCINE 09/27/2017     Health Maintenance Due   Topic Date Due    COVID-19 Vaccine (1) Never done    Cervical Cancer Screening  01/17/2022     Physical Exam      Vital Signs  Temp: 98.4 °F (36.9 °C)  Temp src: Oral  Pulse: 81  SpO2: 98 %  BP: 108/68  BP Location: Left arm  Patient Position: Sitting  Height and Weight  Height: 5' 4" (162.6 cm)  Weight: 50.8 kg (112 lb 1.7 oz)  BSA " (Calculated - sq m): 1.52 sq meters  BMI (Calculated): 19.2  Weight in (lb) to have BMI = 25: 145.3]    Physical Exam  Vitals reviewed.   Constitutional:       General: She is not in acute distress.     Appearance: Normal appearance. She is normal weight. She is not ill-appearing or toxic-appearing.   HENT:      Head: Normocephalic and atraumatic.      Right Ear: External ear normal.      Left Ear: External ear normal.      Nose: Nose normal.      Mouth/Throat:      Mouth: Mucous membranes are moist.      Pharynx: Oropharynx is clear.   Eyes:      Extraocular Movements: Extraocular movements intact.      Conjunctiva/sclera: Conjunctivae normal.   Cardiovascular:      Rate and Rhythm: Normal rate and regular rhythm.      Pulses: Normal pulses.      Heart sounds: Normal heart sounds.   Pulmonary:      Effort: Pulmonary effort is normal. No respiratory distress.      Breath sounds: No stridor. No wheezing, rhonchi or rales.   Abdominal:      General: Abdomen is flat. Bowel sounds are normal. There is no distension.      Palpations: Abdomen is soft. There is no mass.      Tenderness: There is no abdominal tenderness. There is no right CVA tenderness, left CVA tenderness or guarding.   Musculoskeletal:         General: No tenderness. Normal range of motion.      Cervical back: Normal range of motion and neck supple. No rigidity or tenderness.      Right lower leg: No edema.      Left lower leg: No edema.   Skin:     General: Skin is warm and dry.      Capillary Refill: Capillary refill takes less than 2 seconds.   Neurological:      General: No focal deficit present.      Mental Status: She is alert and oriented to person, place, and time. Mental status is at baseline.      Motor: No weakness.      Gait: Gait normal.   Psychiatric:         Mood and Affect: Mood normal.         Behavior: Behavior normal.        Assessment/Plan     Seema Myrick is a 31 y.o.female with:    Problem List Items Addressed This Visit         ENT    Vertigo    Relevant Medications    meclizine (ANTIVERT) 25 mg tablet       Pulmonary    Mild intermittent asthma without complication    Relevant Medications    albuterol (PROVENTIL) 2.5 mg /3 mL (0.083 %) nebulizer solution    Asthma       Cardiac/Vascular    Encounter for lipid screening for cardiovascular disease    Relevant Orders    Lipid Panel (Completed)    PFO (patent foramen ovale)    Relevant Orders    Ambulatory referral/consult to Cardiology       Endocrine    BMI less than 19,adult    Current Assessment & Plan     Wt Readings from Last 3 Encounters:   05/19/22 1431 50.8 kg (112 lb 1.7 oz)   04/28/22 1622 49.9 kg (110 lb)   04/20/22 1722 54.2 kg (119 lb 6.4 oz)   Recommendations:   Stay physically active. As tolerated alternate resistance training with stretching and cardio. Goal of 150 minutes per week of moderate intensity activity or 7,500 - 10,000 steps per day. Follow the Mediterranean Diet. Include whole fresh fruits, vegetables, olive oil, seeds, nuts, whole grains, cold water fish, salmon, mackerel and lean cuts of meat.  Do not drink sugary/diet carbonated beverages. Avoid fast or fried and processed food, especially canned foods. Avoid refined carbohydrates, white starchy foods, flour, white potato, bread, muffins, and cakes.   Follow a healthy diet that includes enough  iron, calcium, vitamin D and proteins for bone health.                GI    GERD (gastroesophageal reflux disease) - Primary    Relevant Orders    Ambulatory referral/consult to General Surgery    CBC Auto Differential (Completed)    Comprehensive Metabolic Panel (Completed)    FL Small Bowel Follow Through    Small bowel disease    Relevant Orders    FL Small Bowel Follow Through      Other Visit Diagnoses     Nausea and vomiting, intractability of vomiting not specified, unspecified vomiting type        Relevant Orders    FL Small Bowel Follow Through        Other than changes above, cont current medications and  maintain follow up with specialists.  No follow-ups on file.    Future Appointments   Date Time Provider Department Center   6/20/2022  1:00 PM Stacy Hoover DO [facility]       Kazumi G Yoshinaga, DO [facility]

## 2022-05-19 NOTE — ASSESSMENT & PLAN NOTE
Wt Readings from Last 3 Encounters:   05/19/22 1431 50.8 kg (112 lb 1.7 oz)   04/28/22 1622 49.9 kg (110 lb)   04/20/22 1722 54.2 kg (119 lb 6.4 oz)   Recommendations:   Stay physically active. As tolerated alternate resistance training with stretching and cardio. Goal of 150 minutes per week of moderate intensity activity or 7,500 - 10,000 steps per day. Follow the Mediterranean Diet. Include whole fresh fruits, vegetables, olive oil, seeds, nuts, whole grains, cold water fish, salmon, mackerel and lean cuts of meat.  Do not drink sugary/diet carbonated beverages. Avoid fast or fried and processed food, especially canned foods. Avoid refined carbohydrates, white starchy foods, flour, white potato, bread, muffins, and cakes.   Follow a healthy diet that includes enough  iron, calcium, vitamin D and proteins for bone health.

## 2022-05-20 PROBLEM — Z13.220 ENCOUNTER FOR LIPID SCREENING FOR CARDIOVASCULAR DISEASE: Status: ACTIVE | Noted: 2022-05-20

## 2022-05-20 PROBLEM — R42 VERTIGO: Status: ACTIVE | Noted: 2022-05-20

## 2022-05-20 PROBLEM — K63.9: Status: ACTIVE | Noted: 2022-05-20

## 2022-05-20 PROBLEM — Z13.6 ENCOUNTER FOR LIPID SCREENING FOR CARDIOVASCULAR DISEASE: Status: ACTIVE | Noted: 2022-05-20

## 2022-05-20 PROBLEM — Q21.12 PFO (PATENT FORAMEN OVALE): Status: ACTIVE | Noted: 2022-05-20

## 2022-05-20 PROBLEM — J45.909 ASTHMA: Status: ACTIVE | Noted: 2022-05-20

## 2022-06-08 ENCOUNTER — PATIENT MESSAGE (OUTPATIENT)
Dept: SURGERY | Facility: CLINIC | Age: 32
End: 2022-06-08
Payer: MEDICAID

## 2022-06-08 ENCOUNTER — PATIENT OUTREACH (OUTPATIENT)
Dept: ADMINISTRATIVE | Facility: HOSPITAL | Age: 32
End: 2022-06-08
Payer: MEDICAID

## 2022-06-17 ENCOUNTER — HOSPITAL ENCOUNTER (EMERGENCY)
Facility: HOSPITAL | Age: 32
Discharge: HOME | End: 2022-06-17
Attending: EMERGENCY MEDICINE
Payer: MEDICAID

## 2022-06-17 VITALS
WEIGHT: 119 LBS | DIASTOLIC BLOOD PRESSURE: 64 MMHG | RESPIRATION RATE: 16 BRPM | OXYGEN SATURATION: 100 % | HEART RATE: 78 BPM | BODY MASS INDEX: 20.32 KG/M2 | SYSTOLIC BLOOD PRESSURE: 110 MMHG | TEMPERATURE: 99 F | HEIGHT: 64 IN

## 2022-06-17 DIAGNOSIS — N39.0 URINARY TRACT INFECTION WITHOUT HEMATURIA, SITE UNSPECIFIED: Primary | ICD-10-CM

## 2022-06-17 DIAGNOSIS — R11.2 NON-INTRACTABLE VOMITING WITH NAUSEA, UNSPECIFIED VOMITING TYPE: ICD-10-CM

## 2022-06-17 LAB
ALBUMIN SERPL BCP-MCNC: 3.4 G/DL (ref 3.5–5.2)
ALP SERPL-CCNC: 65 U/L (ref 55–135)
ALT SERPL W/O P-5'-P-CCNC: 16 U/L (ref 10–44)
ANION GAP SERPL CALC-SCNC: 3 MMOL/L (ref 8–16)
AST SERPL-CCNC: 11 U/L (ref 10–40)
B-HCG UR QL: NEGATIVE
BACTERIA #/AREA URNS HPF: ABNORMAL /HPF
BASOPHILS # BLD AUTO: 0.03 K/UL (ref 0–0.2)
BASOPHILS NFR BLD: 0.4 % (ref 0–1.9)
BILIRUB SERPL-MCNC: 0.3 MG/DL (ref 0.1–1)
BILIRUB UR QL STRIP: NEGATIVE
BUN SERPL-MCNC: 8 MG/DL (ref 6–20)
CALCIUM SERPL-MCNC: 8.7 MG/DL (ref 8.7–10.5)
CHLORIDE SERPL-SCNC: 108 MMOL/L (ref 95–110)
CLARITY UR: ABNORMAL
CO2 SERPL-SCNC: 29 MMOL/L (ref 23–29)
COLOR UR: YELLOW
CREAT SERPL-MCNC: 0.8 MG/DL (ref 0.5–1.4)
DIFFERENTIAL METHOD: ABNORMAL
EOSINOPHIL # BLD AUTO: 0 K/UL (ref 0–0.5)
EOSINOPHIL NFR BLD: 0.5 % (ref 0–8)
ERYTHROCYTE [DISTWIDTH] IN BLOOD BY AUTOMATED COUNT: 12.7 % (ref 11.5–14.5)
EST. GFR  (AFRICAN AMERICAN): >60 ML/MIN/1.73 M^2
EST. GFR  (NON AFRICAN AMERICAN): >60 ML/MIN/1.73 M^2
GLUCOSE SERPL-MCNC: 98 MG/DL (ref 70–110)
GLUCOSE UR QL STRIP: NEGATIVE
HCT VFR BLD AUTO: 37.9 % (ref 37–48.5)
HGB BLD-MCNC: 12.6 G/DL (ref 12–16)
HGB UR QL STRIP: ABNORMAL
HYALINE CASTS #/AREA URNS LPF: 2.3 /LPF
IMM GRANULOCYTES # BLD AUTO: 0.06 K/UL (ref 0–0.04)
IMM GRANULOCYTES NFR BLD AUTO: 0.8 % (ref 0–0.5)
KETONES UR QL STRIP: NEGATIVE
LEUKOCYTE ESTERASE UR QL STRIP: ABNORMAL
LIPASE SERPL-CCNC: 65 U/L (ref 23–300)
LYMPHOCYTES # BLD AUTO: 1.7 K/UL (ref 1–4.8)
LYMPHOCYTES NFR BLD: 22.2 % (ref 18–48)
MCH RBC QN AUTO: 31 PG (ref 27–31)
MCHC RBC AUTO-ENTMCNC: 33.2 G/DL (ref 32–36)
MCV RBC AUTO: 93 FL (ref 82–98)
MICROSCOPIC COMMENT: ABNORMAL
MONOCYTES # BLD AUTO: 0.9 K/UL (ref 0.3–1)
MONOCYTES NFR BLD: 11.6 % (ref 4–15)
NEUTROPHILS # BLD AUTO: 4.8 K/UL (ref 1.8–7.7)
NEUTROPHILS NFR BLD: 64.5 % (ref 38–73)
NITRITE UR QL STRIP: POSITIVE
NRBC BLD-RTO: 0 /100 WBC
PH UR STRIP: 5 [PH] (ref 5–8)
PLATELET # BLD AUTO: 220 K/UL (ref 150–450)
PMV BLD AUTO: 9.5 FL (ref 9.2–12.9)
POTASSIUM SERPL-SCNC: 3.6 MMOL/L (ref 3.5–5.1)
PROT SERPL-MCNC: 7.6 G/DL (ref 6–8.4)
PROT UR QL STRIP: ABNORMAL
RBC # BLD AUTO: 4.06 M/UL (ref 4–5.4)
RBC #/AREA URNS HPF: 7 /HPF (ref 0–4)
SODIUM SERPL-SCNC: 140 MMOL/L (ref 136–145)
SP GR UR STRIP: 1.01 (ref 1–1.03)
SQUAMOUS #/AREA URNS HPF: 3 /HPF
URN SPEC COLLECT METH UR: ABNORMAL
UROBILINOGEN UR STRIP-ACNC: 1 EU/DL
WBC # BLD AUTO: 7.47 K/UL (ref 3.9–12.7)
WBC #/AREA URNS HPF: >100 /HPF (ref 0–5)

## 2022-06-17 PROCEDURE — 87086 URINE CULTURE/COLONY COUNT: CPT | Performed by: EMERGENCY MEDICINE

## 2022-06-17 PROCEDURE — 96365 THER/PROPH/DIAG IV INF INIT: CPT

## 2022-06-17 PROCEDURE — 85025 COMPLETE CBC W/AUTO DIFF WBC: CPT | Performed by: EMERGENCY MEDICINE

## 2022-06-17 PROCEDURE — 87088 URINE BACTERIA CULTURE: CPT | Performed by: EMERGENCY MEDICINE

## 2022-06-17 PROCEDURE — 87186 SC STD MICRODIL/AGAR DIL: CPT | Performed by: EMERGENCY MEDICINE

## 2022-06-17 PROCEDURE — 81000 URINALYSIS NONAUTO W/SCOPE: CPT | Performed by: EMERGENCY MEDICINE

## 2022-06-17 PROCEDURE — 99284 EMERGENCY DEPT VISIT MOD MDM: CPT | Mod: 25

## 2022-06-17 PROCEDURE — 96361 HYDRATE IV INFUSION ADD-ON: CPT

## 2022-06-17 PROCEDURE — 80053 COMPREHEN METABOLIC PANEL: CPT | Performed by: EMERGENCY MEDICINE

## 2022-06-17 PROCEDURE — 36415 COLL VENOUS BLD VENIPUNCTURE: CPT | Performed by: EMERGENCY MEDICINE

## 2022-06-17 PROCEDURE — 63600175 PHARM REV CODE 636 W HCPCS: Performed by: EMERGENCY MEDICINE

## 2022-06-17 PROCEDURE — 83690 ASSAY OF LIPASE: CPT | Performed by: EMERGENCY MEDICINE

## 2022-06-17 PROCEDURE — 87077 CULTURE AEROBIC IDENTIFY: CPT | Performed by: EMERGENCY MEDICINE

## 2022-06-17 PROCEDURE — 81025 URINE PREGNANCY TEST: CPT | Performed by: EMERGENCY MEDICINE

## 2022-06-17 PROCEDURE — 25000003 PHARM REV CODE 250: Performed by: EMERGENCY MEDICINE

## 2022-06-17 RX ORDER — DICYCLOMINE HYDROCHLORIDE 20 MG/1
20 TABLET ORAL 2 TIMES DAILY
Qty: 20 TABLET | Refills: 0 | Status: SHIPPED | OUTPATIENT
Start: 2022-06-17 | End: 2022-06-27

## 2022-06-17 RX ORDER — LIDOCAINE HYDROCHLORIDE 20 MG/ML
10 SOLUTION OROPHARYNGEAL ONCE
Status: COMPLETED | OUTPATIENT
Start: 2022-06-17 | End: 2022-06-17

## 2022-06-17 RX ORDER — CEPHALEXIN 250 MG/1
500 CAPSULE ORAL
Status: COMPLETED | OUTPATIENT
Start: 2022-06-17 | End: 2022-06-17

## 2022-06-17 RX ORDER — CEPHALEXIN 500 MG/1
500 CAPSULE ORAL 4 TIMES DAILY
Qty: 20 CAPSULE | Refills: 0 | Status: SHIPPED | OUTPATIENT
Start: 2022-06-17 | End: 2022-06-17 | Stop reason: SDUPTHER

## 2022-06-17 RX ORDER — CEPHALEXIN 500 MG/1
500 CAPSULE ORAL 4 TIMES DAILY
Qty: 28 CAPSULE | Refills: 0 | Status: SHIPPED | OUTPATIENT
Start: 2022-06-17 | End: 2022-06-24

## 2022-06-17 RX ORDER — MAG HYDROX/ALUMINUM HYD/SIMETH 200-200-20
30 SUSPENSION, ORAL (FINAL DOSE FORM) ORAL ONCE
Status: COMPLETED | OUTPATIENT
Start: 2022-06-17 | End: 2022-06-17

## 2022-06-17 RX ADMIN — CEPHALEXIN 500 MG: 250 CAPSULE ORAL at 07:06

## 2022-06-17 RX ADMIN — PROMETHAZINE HYDROCHLORIDE 25 MG: 25 INJECTION INTRAMUSCULAR; INTRAVENOUS at 07:06

## 2022-06-17 RX ADMIN — LIDOCAINE HYDROCHLORIDE 10 ML: 20 SOLUTION ORAL; TOPICAL at 07:06

## 2022-06-17 RX ADMIN — ALUMINUM HYDROXIDE, MAGNESIUM HYDROXIDE, AND SIMETHICONE 30 ML: 200; 200; 20 SUSPENSION ORAL at 07:06

## 2022-06-17 RX ADMIN — SODIUM CHLORIDE 1000 ML: 0.9 INJECTION, SOLUTION INTRAVENOUS at 07:06

## 2022-06-17 NOTE — Clinical Note
"Seema"Nabil Myrick was seen and treated in our emergency department on 6/17/2022.     COVID-19 is present in our communities across the state. There is limited testing for COVID at this time, so not all patients can be tested. In this situation, your employee meets the following criteria:    Seema Myrick has met the criteria for COVID-19 testing based upon symptoms, travel, and/or potential exposure. The test has been completed and is pending results at this time. During this time the employee is not able to work and should be quarantined per the Centers for Disease Control timelines.     If you have any questions or concerns, or if I can be of further assistance, please do not hesitate to contact me.    Sincerely,             Niles Pettit MD"

## 2022-06-18 NOTE — ED PROVIDER NOTES
EMERGENCY DEPARTMENT HISTORY AND PHYSICAL EXAM          Date: 6/17/2022   Patient Name: Seema Myrick       History of Presenting Illness           Chief Complaint   Patient presents with    Abdominal Pain     Pt stated that she began experiencing upper abdominal/epigastric pain with n/v/d and fever 2 days prior. Hx GERD         History Provided By: Patient    1900   Seema Myrick is a 31 y.o. female with PMHX of asthma, GERD, anxiety, nephrolithiasis, cholecystectomy, IBS, complex pain syndrome affecting left lower extremity, left knee arthroscopy 08/20/2020 who presents to the emergency department C/O abdominal pain.    Patient reports her right upper quadrant and epigastric abdominal pain x3 days.  Reports nausea and food intolerance.  She states that she has been taking her Pepcid and Protonix which she has not been helping.  Reports fever of 102 last night took Tylenol at 7:00 a.m. this morning.  Reports night fevers.  Patient concerned that she has an infection and states she has a twisted small-bowel.  She is not sure what type of infection she has had but said it was a few years ago.  She reports she can only take Phenergan for nausea which typically helps.    Patient with history of GI problems.  EGD December 2019 showed normal esophagus, normal stomach, normal duodenal mucosa with significant tortuosity to distal stomach and small bowel disease.  Her primary care documentation patient has been referred to General surgery for repeat EGD.    She is followed by GI, Dr Enriquez.        PCP: Stacy Hoover, DO        No current facility-administered medications for this encounter.     Current Outpatient Medications   Medication Sig Dispense Refill    AJOVY AUTOINJECTOR 225 mg/1.5 mL autoinjector INJECT 1 PEN INJECTOR UNDER THE SKIN ONCE A MONTH      albuterol (PROVENTIL) 2.5 mg /3 mL (0.083 %) nebulizer solution TAKE 3 ML BY NEBULIZATION EVERY 6 HOURS AS NEEDED FOR WHEEZE OR SHORTNESS OF  BREATH 180 mL 11    albuterol (PROVENTIL/VENTOLIN HFA) 90 mcg/actuation inhaler INHALE 2 PUFFS BY MOUTH EVERY 6 HOURS AS NEEDED FOR WHEEZING OR SHORTNESS OF BREATH 18 g 11    ALBUTEROL INHL Inhale into the lungs.      BABY ASPIRIN ORAL Take by mouth.      brivaracetam (BRIVIACT) 50 mg Tab Take 50 mg by mouth 2 (two) times daily.      cephALEXin (KEFLEX) 500 MG capsule Take 1 capsule (500 mg total) by mouth 4 (four) times daily. for 7 days 28 capsule 0    dicyclomine (BENTYL) 20 mg tablet Take 1 tablet (20 mg total) by mouth 2 (two) times daily. for 10 days 20 tablet 0    famotidine (PEPCID) 20 MG tablet Take 1 tablet (20 mg total) by mouth 2 (two) times daily as needed for Heartburn. 60 tablet 5    fremanezumab-vfrm (AJOVY AUTOINJECTOR) 225 mg/1.5 mL autoinjector Inject 225 mg into the skin every 28 days.      LORazepam (ATIVAN) 0.5 MG tablet Take 0.5 mg by mouth every 8 (eight) hours as needed for Anxiety.       meclizine (ANTIVERT) 25 mg tablet TAKE 1 TABLET(25 MG) BY MOUTH THREE TIMES DAILY AS NEEDED FOR DIZZINESS 90 tablet 5    OSCIMIN SL 0.125 mg Subl TAKE 1 TABLET BY MOUTH EVERY 4 HOURS AS NEEDED FOR CRAMP LIKE PAIN 120 tablet 1    pantoprazole (PROTONIX) 40 MG tablet Take 1 tablet (40 mg total) by mouth 2 (two) times daily. 180 tablet 3    promethazine (PHENERGAN) 12.5 MG Tab Take 1 tablet (12.5 mg total) by mouth 4 (four) times daily. 120 tablet 2    rimegepant (NURTEC) 75 mg odt Take 75 mg by mouth once as needed for Migraine. Place ODT tablet on the tongue; alternatively the ODT tablet may be placed under the tongue      tamsulosin (FLOMAX) 0.4 mg Cap TAKE 1 CAPSULE(0.4 MG) BY MOUTH EVERY DAY 90 capsule 3    tiZANidine (ZANAFLEX) 4 MG tablet Take 4 mg by mouth every 6 (six) hours as needed (take on mon, wed, fri).             Past History     Past Medical History:   Past Medical History:   Diagnosis Date    Anemia     Anxiety     Arthritis     states to lower back from falling in 2008     Asthma     Carpal tunnel syndrome     Carpal tunnel syndrome on right     Fibrosis, breast     Gallstones     GERD (gastroesophageal reflux disease)     IBS (irritable bowel syndrome)     Kidney stones     Kidney stones     Malignant hyperthermia     age1    Migraine headache     Osteoporosis     PFO (patent foramen ovale)     PFO (patent foramen ovale)     not flowing correctly (size 5)    Small intestine disorder     twisted        Past Surgical History:   Past Surgical History:   Procedure Laterality Date    ADENOIDECTOMY      ADENOIDECTOMY      ARTHROSCOPY OF KNEE Left 8/7/2020    Procedure: ARTHROSCOPY, KNEE;  Surgeon: Mal Ayala MD;  Location: [facility] OR;  Service: Orthopedics;  Laterality: Left;    CARPAL TUNNEL RELEASE Right 10/29/2019    Procedure: RELEASE, CARPAL TUNNEL;  Surgeon: Mal Ayala MD;  Location: [facility] OR;  Service: Orthopedics;  Laterality: Right;    CHOLECYSTECTOMY      ESOPHAGOGASTRODUODENOSCOPY N/A 12/5/2019    Procedure: EGD (ESOPHAGOGASTRODUODENOSCOPY);  Surgeon: Claudio Enriquez MD;  Location: [facility];  Service: Endoscopy;  Laterality: N/A;    injections to neck      LITHOTRIPSY      TONSILLECTOMY      TYMPANOSTOMY TUBE PLACEMENT          Family History:   Family History   Problem Relation Age of Onset    Thyroid disease Mother     Carpal tunnel syndrome Mother     Cholecystitis Father     Kidney disease Father     Gout Father     No Known Problems Paternal Grandmother     No Known Problems Paternal Grandfather     No Known Problems Maternal Grandmother     Brain cancer Maternal Grandfather     Brain cancer Paternal Aunt         Social History:   Social History     Tobacco Use    Smoking status: Never Smoker    Smokeless tobacco: Never Used   Substance Use Topics    Alcohol use: No    Drug use: No        Allergies:   Review of patient's allergies indicates:   Allergen Reactions    Halothane Other (See Comments)     Malignant  "Hyperthermia    Mucinex [guaifenesin] Other (See Comments)     Asthma attack     Succinylcholine Other (See Comments)     Malignant Hyperthermia    Amoxil [amoxicillin] Other (See Comments)     Makes my stomach hurt    Bactrim [sulfamethoxazole-trimethoprim] Nausea And Vomiting    Compazine [prochlorperazine edisylate]     Doxycycline     Ibuprofen      Chest pains    Neurontin [gabapentin] Other (See Comments)     Patient reports if makes her whole body numb    Succinylcholine chloride      Other reaction(s): Unknown    Toradol [ketorolac]      MIGRAINES    Tramadol      MIGRAINES    Trintellix [vortioxetine]      "PASS OUT AND CHEST PAIN"    Zofran [ondansetron hcl (pf)] Other (See Comments)     States: "It gives me migraines"       Brompheniramine-pseudoeph-dm Rash     Other reaction(s): Unknown  Other reaction(s): Unknown    Capzasin [capsaicin] Rash    Levaquin [levofloxacin] Rash          Review of Systems   Review of Systems   Constitutional: Positive for appetite change, chills and fever. Negative for fatigue.   Respiratory: Negative for cough and shortness of breath.    Cardiovascular: Negative for chest pain and leg swelling.   Gastrointestinal: Positive for abdominal pain, diarrhea, nausea and vomiting.   All other systems reviewed and are negative.               Physical Exam     Vitals:    06/17/22 1847 06/17/22 2007   BP: 107/76 110/64   BP Location:  Left arm   Patient Position:  Lying   Pulse: 96 78   Resp: 16 16   Temp: 98.7 °F (37.1 °C) 98.7 °F (37.1 °C)   TempSrc:  Oral   SpO2: 99% 100%   Weight: 54 kg (119 lb)    Height: 5' 4" (1.626 m)       Physical Exam  Vitals and nursing note reviewed.   Constitutional:       General: She is not in acute distress.     Appearance: Normal appearance. She is not ill-appearing or toxic-appearing.   HENT:      Head: Normocephalic and atraumatic.      Nose: Nose normal. No congestion or rhinorrhea.      Mouth/Throat:      Mouth: Mucous membranes " are moist.   Eyes:      Extraocular Movements: Extraocular movements intact.      Pupils: Pupils are equal, round, and reactive to light.   Cardiovascular:      Rate and Rhythm: Normal rate and regular rhythm.      Heart sounds: Normal heart sounds. No murmur heard.  Pulmonary:      Effort: Pulmonary effort is normal. No respiratory distress.   Abdominal:      General: Abdomen is flat. Bowel sounds are normal.      Palpations: Abdomen is soft.      Tenderness: There is generalized abdominal tenderness and tenderness in the right upper quadrant. There is no guarding or rebound. Negative signs include McBurney's sign.   Musculoskeletal:         General: No tenderness or deformity. Normal range of motion.      Cervical back: Normal range of motion.   Skin:     General: Skin is warm and dry.      Capillary Refill: Capillary refill takes less than 2 seconds.   Neurological:      General: No focal deficit present.      Mental Status: She is alert and oriented to person, place, and time. Mental status is at baseline.   Psychiatric:         Mood and Affect: Mood normal.         Behavior: Behavior normal.              Diagnostic Study Results      Labs -   Recent Results (from the past 12 hour(s))   CBC W/ AUTO DIFFERENTIAL    Collection Time: 06/17/22  7:02 PM   Result Value Ref Range    WBC 7.47 3.90 - 12.70 K/uL    RBC 4.06 4.00 - 5.40 M/uL    Hemoglobin 12.6 12.0 - 16.0 g/dL    Hematocrit 37.9 37.0 - 48.5 %    MCV 93 82 - 98 fL    MCH 31.0 27.0 - 31.0 pg    MCHC 33.2 32.0 - 36.0 g/dL    RDW 12.7 11.5 - 14.5 %    Platelets 220 150 - 450 K/uL    MPV 9.5 9.2 - 12.9 fL    Immature Granulocytes 0.8 (H) 0.0 - 0.5 %    Gran # (ANC) 4.8 1.8 - 7.7 K/uL    Immature Grans (Abs) 0.06 (H) 0.00 - 0.04 K/uL    Lymph # 1.7 1.0 - 4.8 K/uL    Mono # 0.9 0.3 - 1.0 K/uL    Eos # 0.0 0.0 - 0.5 K/uL    Baso # 0.03 0.00 - 0.20 K/uL    nRBC 0 0 /100 WBC    Gran % 64.5 38.0 - 73.0 %    Lymph % 22.2 18.0 - 48.0 %    Mono % 11.6 4.0 - 15.0 %     Eosinophil % 0.5 0.0 - 8.0 %    Basophil % 0.4 0.0 - 1.9 %    Differential Method Automated    Comp. Metabolic Panel    Collection Time: 06/17/22  7:02 PM   Result Value Ref Range    Sodium 140 136 - 145 mmol/L    Potassium 3.6 3.5 - 5.1 mmol/L    Chloride 108 95 - 110 mmol/L    CO2 29 23 - 29 mmol/L    Glucose 98 70 - 110 mg/dL    BUN 8 6 - 20 mg/dL    Creatinine 0.8 0.5 - 1.4 mg/dL    Calcium 8.7 8.7 - 10.5 mg/dL    Total Protein 7.6 6.0 - 8.4 g/dL    Albumin 3.4 (L) 3.5 - 5.2 g/dL    Total Bilirubin 0.3 0.1 - 1.0 mg/dL    Alkaline Phosphatase 65 55 - 135 U/L    AST 11 10 - 40 U/L    ALT 16 10 - 44 U/L    Anion Gap 3 (L) 8 - 16 mmol/L    eGFR if African American >60.0 >60 mL/min/1.73 m^2    eGFR if non African American >60.0 >60 mL/min/1.73 m^2   Lipase    Collection Time: 06/17/22  7:02 PM   Result Value Ref Range    Lipase Result 65 23 - 300 U/L   Urinalysis, Reflex to Urine Culture Urine, Clean Catch    Collection Time: 06/17/22  7:09 PM    Specimen: Urine, Clean Catch   Result Value Ref Range    Specimen UA Urine, Clean Catch     Color, UA Yellow Yellow, Straw, Laurie    Appearance, UA Cloudy (A) Clear    pH, UA 5.0 5.0 - 8.0    Specific Gravity, UA 1.010 1.005 - 1.030    Protein, UA 1+ (A) Negative    Glucose, UA Negative Negative    Ketones, UA Negative Negative    Bilirubin (UA) Negative Negative    Occult Blood UA Trace (A) Negative    Nitrite, UA Positive (A) Negative    Urobilinogen, UA 1.0 <2.0 EU/dL    Leukocytes, UA 2+ (A) Negative   Pregnancy, urine rapid    Collection Time: 06/17/22  7:09 PM   Result Value Ref Range    Preg Test, Ur Negative    Urinalysis Microscopic    Collection Time: 06/17/22  7:09 PM   Result Value Ref Range    RBC, UA 7 (H) 0 - 4 /hpf    WBC, UA >100 (H) 0 - 5 /hpf    Bacteria Many (A) None-Occ /hpf    Squam Epithel, UA 3 /hpf    Hyaline Casts, UA 2.3 (A) 0-1/lpf /lpf    Microscopic Comment SEE COMMENT         Radiologic Studies -    No orders to display        Medications given  in the ED-   Medications   sodium chloride 0.9% bolus 1,000 mL (0 mLs Intravenous Stopped 6/17/22 2018)   promethazine (PHENERGAN) 25 mg in dextrose 5 % 50 mL IVPB (0 mg Intravenous Stopped 6/17/22 1947)   aluminum-magnesium hydroxide-simethicone 200-200-20 mg/5 mL suspension 30 mL (30 mLs Oral Given 6/17/22 1915)     And   LIDOcaine HCl 2% oral solution 10 mL (10 mLs Oral Given 6/17/22 1915)   cephALEXin capsule 500 mg (500 mg Oral Given 6/17/22 1949)           Medical Decision Making    I am the first provider for this patient.     I reviewed the vital signs, available nursing notes, past medical history, past surgical history, family history and social history.     Vital Signs:  Reviewed the patient's vital signs.     Pulse Oximetry Analysis and Interpretation:    99% on Room Air, normal      Records Reviewed: Old medical records.  Nursing notes.  Previous radiology studies.        Provider Notes (Medical Decision Making): Seema Myrick is a 31 y.o. female here with abdominal pain.  This is an acute on chronic problem.  Reviewed prior EGD which shows tortuous distal stomach and small intestine and concern for obstructive etiology for her food intolerance.      Procedures:   Procedures      ED Course:    12/2019 Endoscopy Note:  Impression:           - Normal mucosa                         - Obstructive symptoms may be able to be explained                         by GI tract toruousity seen today                         - Likely has functional component   Recommendation:       - Continue Bentyl prn                         - Check CT with PO and IV contrast to assess for                         obstructive pathology        12/2019 CT A/P with contrast  The large and small bowel are normal in caliber.  The uterus and both ovaries are identified and appear normal in size.  In the right adnexa there is a tubular cystic mass which may represent dilated fallopian tube/hydro salpinx.  No free pelvic fluid is  noted.     Impression:     1.  Possible right hydrosalpinx, otherwise no significant abnormality identified within abdomen or pelvis.    10/2021 CT A/P without contrast  FINDINGS:  Contrast throughout  system from earlier CT angiogram.  No hydronephrosis.     Given above, unremarkable appearing liver, kidneys, adrenals, spleen and pancreas.  Prior cholecystectomy.  No free air free fluid.  No focal inflammatory changes.     Impression:     No acute findings in the abdomen or pelvis      7:32 PM  Labs benign.  Urine consistent with UTI.  Reviewed prior cultures which did not show past growth.  Patient has reported history of pyelonephritis so will treat with Keflex QID.     Diagnosis and Disposition     Critical Care:      DISCHARGE NOTE:       Seema Myrick's  results have been reviewed with her.  She has been counseled regarding her diagnosis, treatment, and plan.  She verbally conveys understanding and agreement of the signs, symptoms, diagnosis, treatment and prognosis and additionally agrees to follow up as discussed.  She also agrees with the care-plan and conveys that all of her questions have been answered.  I have also provided discharge instructions for her that include: educational information regarding their diagnosis and treatment, and list of reasons why they would want to return to the ED prior to their follow-up appointment, should her condition change. She has been provided with education for proper emergency department utilization.         CLINICAL IMPRESSION:         1. Urinary tract infection without hematuria, site unspecified    2. Non-intractable vomiting with nausea, unspecified vomiting type              PLAN:   1. Discharge Home  2.      Medication List      START taking these medications    cephALEXin 500 MG capsule  Commonly known as: KEFLEX  Take 1 capsule (500 mg total) by mouth 4 (four) times daily. for 7 days     dicyclomine 20 mg tablet  Commonly known as: BENTYL  Take 1 tablet  (20 mg total) by mouth 2 (two) times daily. for 10 days        ASK your doctor about these medications    * AJOVY AUTOINJECTOR 225 mg/1.5 mL autoinjector  Generic drug: fremanezumab-vfrm     * AJOVY AUTOINJECTOR 225 mg/1.5 mL autoinjector  Generic drug: fremanezumab-vfrm     * albuterol 90 mcg/actuation inhaler  Commonly known as: PROVENTIL/VENTOLIN HFA  INHALE 2 PUFFS BY MOUTH EVERY 6 HOURS AS NEEDED FOR WHEEZING OR SHORTNESS OF BREATH     * albuterol 2.5 mg /3 mL (0.083 %) nebulizer solution  Commonly known as: PROVENTIL  TAKE 3 ML BY NEBULIZATION EVERY 6 HOURS AS NEEDED FOR WHEEZE OR SHORTNESS OF BREATH     ALBUTEROL INHL     BABY ASPIRIN ORAL     brivaracetam 50 mg Tab  Commonly known as: BRIVIACT     famotidine 20 MG tablet  Commonly known as: PEPCID  Take 1 tablet (20 mg total) by mouth 2 (two) times daily as needed for Heartburn.     LORazepam 0.5 MG tablet  Commonly known as: ATIVAN     meclizine 25 mg tablet  Commonly known as: ANTIVERT  TAKE 1 TABLET(25 MG) BY MOUTH THREE TIMES DAILY AS NEEDED FOR DIZZINESS     NURTEC 75 mg odt  Generic drug: rimegepant     OSCIMIN SL 0.125 mg Subl  Generic drug: hyoscyamine  TAKE 1 TABLET BY MOUTH EVERY 4 HOURS AS NEEDED FOR CRAMP LIKE PAIN     pantoprazole 40 MG tablet  Commonly known as: PROTONIX  Take 1 tablet (40 mg total) by mouth 2 (two) times daily.     promethazine 12.5 MG Tab  Commonly known as: PHENERGAN  Take 1 tablet (12.5 mg total) by mouth 4 (four) times daily.     tamsulosin 0.4 mg Cap  Commonly known as: FLOMAX  TAKE 1 CAPSULE(0.4 MG) BY MOUTH EVERY DAY     tiZANidine 4 MG tablet  Commonly known as: ZANAFLEX         * This list has 4 medication(s) that are the same as other medications prescribed for you. Read the directions carefully, and ask your doctor or other care provider to review them with you.               Where to Get Your Medications      You can get these medications from any pharmacy    Bring a paper prescription for each of these  medications  · dicyclomine 20 mg tablet     Information about where to get these medications is not yet available    Ask your nurse or doctor about these medications  · cephALEXin 500 MG capsule        3. Stacy Hoover,   [address]  [facility] [address]  [phone]    Call   Primary care follow up    [facility] Emergency Department  [address] [facility] [address] [phone]  Go to   If symptoms worsen       _______________________________     Please note that this dictation was completed with [employer], the computer voice recognition software.  Quite often unanticipated grammatical, syntax, homophones, and other interpretive errors are inadvertently transcribed by the computer software.  Please disregard these errors.  Please excuse any errors that have escaped final proofreading.             Niles Pettit MD  06/18/22 0102

## 2022-06-20 ENCOUNTER — OFFICE VISIT (OUTPATIENT)
Dept: PRIMARY CARE CLINIC | Facility: CLINIC | Age: 32
End: 2022-06-20
Payer: MEDICAID

## 2022-06-20 DIAGNOSIS — J45.20 MILD INTERMITTENT ASTHMA WITHOUT COMPLICATION: ICD-10-CM

## 2022-06-20 DIAGNOSIS — Z11.3 ROUTINE SCREENING FOR STI (SEXUALLY TRANSMITTED INFECTION): Primary | ICD-10-CM

## 2022-06-20 DIAGNOSIS — J45.909 ASTHMA, UNSPECIFIED ASTHMA SEVERITY, UNSPECIFIED WHETHER COMPLICATED, UNSPECIFIED WHETHER PERSISTENT: ICD-10-CM

## 2022-06-20 DIAGNOSIS — N30.01 ACUTE CYSTITIS WITH HEMATURIA: ICD-10-CM

## 2022-06-20 LAB — BACTERIA UR CULT: ABNORMAL

## 2022-06-20 PROCEDURE — 99214 OFFICE O/P EST MOD 30 MIN: CPT | Mod: S$PBB,,, | Performed by: STUDENT IN AN ORGANIZED HEALTH CARE EDUCATION/TRAINING PROGRAM

## 2022-06-20 PROCEDURE — 99214 PR OFFICE/OUTPT VISIT, EST, LEVL IV, 30-39 MIN: ICD-10-PCS | Mod: S$PBB,,, | Performed by: STUDENT IN AN ORGANIZED HEALTH CARE EDUCATION/TRAINING PROGRAM

## 2022-06-20 PROCEDURE — 1159F PR MEDICATION LIST DOCUMENTED IN MEDICAL RECORD: ICD-10-PCS | Mod: CPTII,,, | Performed by: STUDENT IN AN ORGANIZED HEALTH CARE EDUCATION/TRAINING PROGRAM

## 2022-06-20 PROCEDURE — 86696 HERPES SIMPLEX TYPE 2 TEST: CPT | Performed by: STUDENT IN AN ORGANIZED HEALTH CARE EDUCATION/TRAINING PROGRAM

## 2022-06-20 PROCEDURE — 86695 HERPES SIMPLEX TYPE 1 TEST: CPT | Performed by: STUDENT IN AN ORGANIZED HEALTH CARE EDUCATION/TRAINING PROGRAM

## 2022-06-20 PROCEDURE — 99212 OFFICE O/P EST SF 10 MIN: CPT | Mod: PBBFAC,PN | Performed by: STUDENT IN AN ORGANIZED HEALTH CARE EDUCATION/TRAINING PROGRAM

## 2022-06-20 PROCEDURE — 87389 HIV-1 AG W/HIV-1&-2 AB AG IA: CPT | Performed by: STUDENT IN AN ORGANIZED HEALTH CARE EDUCATION/TRAINING PROGRAM

## 2022-06-20 PROCEDURE — 99999 PR PBB SHADOW E&M-EST. PATIENT-LVL II: CPT | Mod: PBBFAC,,, | Performed by: STUDENT IN AN ORGANIZED HEALTH CARE EDUCATION/TRAINING PROGRAM

## 2022-06-20 PROCEDURE — 99999 PR PBB SHADOW E&M-EST. PATIENT-LVL II: ICD-10-PCS | Mod: PBBFAC,,, | Performed by: STUDENT IN AN ORGANIZED HEALTH CARE EDUCATION/TRAINING PROGRAM

## 2022-06-20 PROCEDURE — 86592 SYPHILIS TEST NON-TREP QUAL: CPT | Performed by: STUDENT IN AN ORGANIZED HEALTH CARE EDUCATION/TRAINING PROGRAM

## 2022-06-20 PROCEDURE — 1159F MED LIST DOCD IN RCRD: CPT | Mod: CPTII,,, | Performed by: STUDENT IN AN ORGANIZED HEALTH CARE EDUCATION/TRAINING PROGRAM

## 2022-06-20 RX ORDER — ALBUTEROL SULFATE 90 UG/1
AEROSOL, METERED RESPIRATORY (INHALATION)
Qty: 18 G | Refills: 11 | Status: SHIPPED | OUTPATIENT
Start: 2022-06-20

## 2022-06-20 NOTE — PROGRESS NOTES
[facility]    Chief Complaint      Chief Complaint   Patient presents with    Follow-up     Recent hospital visit for UTI, started new antibiotic today and stomach pain started patient would like to request a different antibiotic.       History of Present Illness      Seema Myrick is a 31 y.o. female who presents today for Follow-up (Recent hospital visit for UTI, started new antibiotic today and stomach pain started patient would like to request a different antibiotic.)    Patient concerned with her allergies to penicillin did not start taking PO Keflex until this morning. One time dose and feels like her stomach continues to hurt, becoming anorexic and drinking gatorade. Per patient she has been told that she should not be drinking water for her kidney stones.     Reviewed recent visit to ED:  Urine culture with Ecoli resistance to Ampicillin. Currently on Keflex QID and appropriate to continue. Counseled at length that given delayed start of treatment, she may not begin to see improvement until another 48 hours of consistent use of antibiotics.   - Antipyretics for fever (take Tylenol, if intolerant to NSAIDs)  - May take Benadryl beforehand or BID to help minimize side effects  - Start daily probiotics  - Complete PO Keflex as instructed  - aggressively hydrate  - phenergan for nausea    Asthma under control.   Requests blood work  for STI, will check RPR and HIV  Past Medical History:  Past Medical History:   Diagnosis Date    Anemia     Anxiety     Arthritis     states to lower back from falling in 2008    Asthma     Carpal tunnel syndrome     Carpal tunnel syndrome on right     Fibrosis, breast     Gallstones     GERD (gastroesophageal reflux disease)     IBS (irritable bowel syndrome)     Kidney stones     Kidney stones     Malignant hyperthermia     age2    Migraine headache     Osteoporosis     PFO (patent foramen ovale)     PFO (patent foramen ovale)     not  flowing correctly (size 5)    Small intestine disorder     twisted       Past Surgical History:   has a past surgical history that includes Tonsillectomy; Tympanostomy tube placement; Cholecystectomy; Adenoidectomy; Lithotripsy; Carpal tunnel release (Right, 10/29/2019); Esophagogastroduodenoscopy (N/A, 12/5/2019); Arthroscopy of knee (Left, 8/7/2020); injections to neck; and Adenoidectomy.    Family History:  family history includes Brain cancer in her maternal grandfather and paternal aunt; Carpal tunnel syndrome in her mother; Cholecystitis in her father; Gout in her father; Kidney disease in her father; No Known Problems in her maternal grandmother, paternal grandfather, and paternal grandmother; Thyroid disease in her mother.     Social History:  Social History     Tobacco Use    Smoking status: Never Smoker    Smokeless tobacco: Never Used   Substance Use Topics    Alcohol use: No    Drug use: No       I personally reviewed all past medical, surgical, social and family history.    Review of Systems   Constitutional: Negative for chills, fever, malaise/fatigue and weight loss.   HENT: Negative for congestion, ear discharge, ear pain, sinus pain and sore throat.    Eyes: Negative for blurred vision, pain, discharge and redness.   Respiratory: Negative for cough, hemoptysis, sputum production, shortness of breath, wheezing and stridor.    Cardiovascular: Negative for chest pain, palpitations and leg swelling.   Gastrointestinal: Negative for abdominal pain, blood in stool, constipation, diarrhea, nausea and vomiting.   Genitourinary: Negative for dysuria, frequency and hematuria.   Musculoskeletal: Negative for back pain, falls, joint pain, myalgias and neck pain.   Skin: Negative for rash.   Neurological: Negative for dizziness, tingling, focal weakness, seizures, weakness and headaches.   Endo/Heme/Allergies: Negative for environmental allergies and polydipsia. Does not bruise/bleed easily.    Psychiatric/Behavioral: Negative for depression and suicidal ideas. The patient is not nervous/anxious.         Medications:  Outpatient Encounter Medications as of 6/20/2022   Medication Sig Note Dispense Refill    AJOVY AUTOINJECTOR 225 mg/1.5 mL autoinjector INJECT 1 PEN INJECTOR UNDER THE SKIN ONCE A MONTH       BABY ASPIRIN ORAL Take by mouth.       brivaracetam (BRIVIACT) 50 mg Tab Take 50 mg by mouth 2 (two) times daily.       cephALEXin (KEFLEX) 500 MG capsule Take 1 capsule (500 mg total) by mouth 4 (four) times daily. for 7 days 6/20/2022: Patient states she started meds today and her stomach is already in pain. She does not want to take this antibiotic anymore. 28 capsule 0    dicyclomine (BENTYL) 20 mg tablet Take 1 tablet (20 mg total) by mouth 2 (two) times daily. for 10 days  20 tablet 0    famotidine (PEPCID) 20 MG tablet Take 1 tablet (20 mg total) by mouth 2 (two) times daily as needed for Heartburn.  60 tablet 5    fremanezumab-vfrm (AJOVY AUTOINJECTOR) 225 mg/1.5 mL autoinjector Inject 225 mg into the skin every 28 days.       LORazepam (ATIVAN) 0.5 MG tablet Take 0.5 mg by mouth every 8 (eight) hours as needed for Anxiety.        meclizine (ANTIVERT) 25 mg tablet TAKE 1 TABLET(25 MG) BY MOUTH THREE TIMES DAILY AS NEEDED FOR DIZZINESS  90 tablet 5    OSCIMIN SL 0.125 mg Subl TAKE 1 TABLET BY MOUTH EVERY 4 HOURS AS NEEDED FOR CRAMP LIKE PAIN  120 tablet 1    pantoprazole (PROTONIX) 40 MG tablet Take 1 tablet (40 mg total) by mouth 2 (two) times daily.  180 tablet 3    promethazine (PHENERGAN) 12.5 MG Tab Take 1 tablet (12.5 mg total) by mouth 4 (four) times daily.  120 tablet 2    rimegepant (NURTEC) 75 mg odt Take 75 mg by mouth once as needed for Migraine. Place ODT tablet on the tongue; alternatively the ODT tablet may be placed under the tongue       tamsulosin (FLOMAX) 0.4 mg Cap TAKE 1 CAPSULE(0.4 MG) BY MOUTH EVERY DAY  90 capsule 3    tiZANidine (ZANAFLEX) 4 MG tablet Take  "4 mg by mouth every 6 (six) hours as needed (take on mon, wed, fri).       [DISCONTINUED] albuterol (PROVENTIL) 2.5 mg /3 mL (0.083 %) nebulizer solution TAKE 3 ML BY NEBULIZATION EVERY 6 HOURS AS NEEDED FOR WHEEZE OR SHORTNESS OF BREATH  180 mL 11    [DISCONTINUED] albuterol (PROVENTIL/VENTOLIN HFA) 90 mcg/actuation inhaler INHALE 2 PUFFS BY MOUTH EVERY 6 HOURS AS NEEDED FOR WHEEZING OR SHORTNESS OF BREATH  18 g 11    [DISCONTINUED] ALBUTEROL INHL Inhale into the lungs.       albuterol (PROVENTIL/VENTOLIN HFA) 90 mcg/actuation inhaler INHALE 2 PUFFS BY MOUTH EVERY 6 HOURS AS NEEDED FOR WHEEZING OR SHORTNESS OF BREATH  18 g 11     No facility-administered encounter medications on file as of 6/20/2022.       Allergies:  Review of patient's allergies indicates:   Allergen Reactions    Halothane Other (See Comments)     Malignant Hyperthermia    Mucinex [guaifenesin] Other (See Comments)     Asthma attack     Succinylcholine Other (See Comments)     Malignant Hyperthermia    Amoxil [amoxicillin] Other (See Comments)     Makes my stomach hurt    Bactrim [sulfamethoxazole-trimethoprim] Nausea And Vomiting    Compazine [prochlorperazine edisylate]     Doxycycline     Ibuprofen      Chest pains    Neurontin [gabapentin] Other (See Comments)     Patient reports if makes her whole body numb    Succinylcholine chloride      Other reaction(s): Unknown    Toradol [ketorolac]      MIGRAINES    Tramadol      MIGRAINES    Trintellix [vortioxetine]      "PASS OUT AND CHEST PAIN"    Zofran [ondansetron hcl (pf)] Other (See Comments)     States: "It gives me migraines"       Brompheniramine-pseudoeph-dm Rash     Other reaction(s): Unknown  Other reaction(s): Unknown    Capzasin [capsaicin] Rash    Levaquin [levofloxacin] Rash       Health Maintenance:  Immunization History   Administered Date(s) Administered    DTaP 1989, 1989, 02/05/1990, 02/27/1991, 06/28/1994    HIB 02/27/1991    HPV " Quadrivalent 02/13/2008, 04/17/2008, 08/25/2008    Hepatitis B 04/13/2000, 05/15/2000, 10/10/2000    IPV 1989, 1989, 02/27/1991, 06/28/1994    Influenza 01/29/2008    MMR 02/27/1991, 06/28/1994    Meningococcal Conjugate (MCV4P) 04/07/2006    Td (ADULT) 08/15/2003, 10/20/2004    Tdap 09/27/2017      Health Maintenance   Topic Date Due    TETANUS VACCINE  09/27/2027    Hepatitis C Screening  Completed    Lipid Panel  Completed        Physical Exam       ]    Physical Exam  Vitals reviewed.   Constitutional:       General: She is not in acute distress.     Appearance: Normal appearance. She is normal weight. She is not toxic-appearing or diaphoretic.   HENT:      Head: Normocephalic and atraumatic.      Right Ear: External ear normal.      Left Ear: External ear normal.      Nose: Nose normal.      Mouth/Throat:      Mouth: Mucous membranes are moist.      Pharynx: Oropharynx is clear.   Eyes:      Extraocular Movements: Extraocular movements intact.      Conjunctiva/sclera: Conjunctivae normal.   Cardiovascular:      Rate and Rhythm: Normal rate and regular rhythm.      Pulses: Normal pulses.      Heart sounds: Normal heart sounds.   Pulmonary:      Effort: Pulmonary effort is normal. No respiratory distress.      Breath sounds: No stridor. No wheezing, rhonchi or rales.   Abdominal:      General: Abdomen is flat. Bowel sounds are normal. There is no distension.      Palpations: Abdomen is soft. There is no mass.      Tenderness: There is no abdominal tenderness. There is no right CVA tenderness, left CVA tenderness or guarding.   Musculoskeletal:         General: No tenderness. Normal range of motion.      Cervical back: Normal range of motion and neck supple. No rigidity or tenderness.      Right lower leg: No edema.      Left lower leg: No edema.   Skin:     General: Skin is warm and dry.      Capillary Refill: Capillary refill takes less than 2 seconds.   Neurological:      General: No focal  deficit present.      Mental Status: She is alert and oriented to person, place, and time. Mental status is at baseline.      Motor: No weakness.      Gait: Gait normal.   Psychiatric:         Mood and Affect: Mood normal.         Behavior: Behavior normal.          Laboratory:  CBC:  Recent Labs   Lab 10/06/21  2007 05/19/22  1621 06/17/22  1902   WBC 15.20 H 8.93 7.47   RBC 4.49 4.48 4.06   Hemoglobin 13.8 13.7 12.6   Hematocrit 41.5 41.7 37.9   Platelets 221 269 220   MCV 92 93 93   MCH 30.8 30.6 31.0   MCHC 33.3 32.9 33.2     CMP:  Recent Labs   Lab 10/06/21  2007 05/19/22  1621 06/17/22  1902   Glucose 98 95 98   Calcium 9.7 9.0 8.7   Albumin 4.5 3.9 3.4 L   Total Protein 8.2 8.0 7.6   Sodium 137 139 140   Potassium 4.4 3.8 3.6   CO2 25 27 29   Chloride 103 109 108   BUN 11 16 8   Alkaline Phosphatase 86 66 65   ALT 17 21 16   AST 28 13 11   Total Bilirubin 1.0 0.5 0.3     URINALYSIS:  Recent Labs   Lab 06/30/20  1031 06/30/20  1032 05/04/21  1233 10/06/21  2016 04/28/22  1800 06/17/22  1909   Color, UA Laurie   < > Yellow  Yellow Yellow   < > Yellow   Clarity, UA  --    < > Clear  --   --   --    Specific Gravity, UA 1.030   < > 1.030 >1.030   < > 1.010   Spec Grav UA  --    < > >=1.030  --   --   --    pH, UA 5.0   < > 5.0  5.0 5.5   < > 5.0   Protein, UA 1+ A   < > Negative 1+ A   < > 1+ A   Bacteria Many A  --  Rare None  --  Many A   Nitrite, UA Negative   < > Negative  Negative Negative   < > Positive A   Leukocytes, UA 3+ A   < > Negative 1+ A   < > 2+ A   Urobilinogen, UA  --    < > 0.2.E.U./dL 2.0-3.0 A   < > 1.0   Hyaline Casts, UA 0  --   --  0  --  2.3 A    < > = values in this interval not displayed.      LIPIDS:  Recent Labs   Lab 06/01/20  0908 07/20/20  0954 10/06/21  2007 05/19/22  1621   TSH 0.451 0.991 0.27 L  --    HDL  --   --   --  70   Cholesterol  --   --   --  185   Triglycerides  --   --   --  60   LDL Cholesterol  --   --   --  103.0   HDL/Cholesterol Ratio  --   --   --  37.8    Non-HDL Cholesterol  --   --   --  115   Total Cholesterol/HDL Ratio  --   --   --  2.6     Assessment/Plan     Seema Myrick is a 31 y.o.female with:    Problem List Items Addressed This Visit        Pulmonary    Mild intermittent asthma without complication    Relevant Medications    albuterol (PROVENTIL/VENTOLIN HFA) 90 mcg/actuation inhaler    Asthma       Renal/    Acute cystitis with hematuria    Current Assessment & Plan     Pain over suprapubic area, negative CVA tenderness.   - continue with oral antibiotics  - f/u 3-5 days or sooner with worsening pain              ID    Routine screening for STI (sexually transmitted infection) - Primary    Relevant Orders    RPR    HIV 1/2 Ag/Ab (4th Gen)    HSV 1 & 2, IgG          Other than changes above, cont current medications and maintain follow up with specialists.  No follow-ups on file.    Future Appointments   Date Time Provider Department Center   6/23/2022  9:15 AM Zelda Lang MD [facility]        Kazumi G Yoshinaga, DO [facility]

## 2022-06-21 LAB
HIV 1+2 AB+HIV1 P24 AG SERPL QL IA: NEGATIVE
HSV1 IGG SERPL QL IA: NEGATIVE
HSV2 IGG SERPL QL IA: POSITIVE
RPR SER QL: NORMAL

## 2022-06-23 ENCOUNTER — OFFICE VISIT (OUTPATIENT)
Dept: SURGERY | Facility: CLINIC | Age: 32
End: 2022-06-23
Payer: MEDICAID

## 2022-06-23 ENCOUNTER — PATIENT MESSAGE (OUTPATIENT)
Dept: ENDOSCOPY | Facility: HOSPITAL | Age: 32
End: 2022-06-23
Payer: MEDICAID

## 2022-06-23 VITALS
HEART RATE: 98 BPM | BODY MASS INDEX: 20.36 KG/M2 | OXYGEN SATURATION: 99 % | HEIGHT: 64 IN | DIASTOLIC BLOOD PRESSURE: 72 MMHG | WEIGHT: 119.25 LBS | SYSTOLIC BLOOD PRESSURE: 109 MMHG

## 2022-06-23 DIAGNOSIS — K21.9 GASTROESOPHAGEAL REFLUX DISEASE, UNSPECIFIED WHETHER ESOPHAGITIS PRESENT: Primary | ICD-10-CM

## 2022-06-23 PROCEDURE — 99999 PR PBB SHADOW E&M-EST. PATIENT-LVL V: CPT | Mod: PBBFAC,,, | Performed by: STUDENT IN AN ORGANIZED HEALTH CARE EDUCATION/TRAINING PROGRAM

## 2022-06-23 PROCEDURE — 3074F PR MOST RECENT SYSTOLIC BLOOD PRESSURE < 130 MM HG: ICD-10-PCS | Mod: CPTII,,, | Performed by: STUDENT IN AN ORGANIZED HEALTH CARE EDUCATION/TRAINING PROGRAM

## 2022-06-23 PROCEDURE — 3078F DIAST BP <80 MM HG: CPT | Mod: CPTII,,, | Performed by: STUDENT IN AN ORGANIZED HEALTH CARE EDUCATION/TRAINING PROGRAM

## 2022-06-23 PROCEDURE — 3074F SYST BP LT 130 MM HG: CPT | Mod: CPTII,,, | Performed by: STUDENT IN AN ORGANIZED HEALTH CARE EDUCATION/TRAINING PROGRAM

## 2022-06-23 PROCEDURE — 3008F BODY MASS INDEX DOCD: CPT | Mod: CPTII,,, | Performed by: STUDENT IN AN ORGANIZED HEALTH CARE EDUCATION/TRAINING PROGRAM

## 2022-06-23 PROCEDURE — 99215 OFFICE O/P EST HI 40 MIN: CPT | Mod: PBBFAC | Performed by: STUDENT IN AN ORGANIZED HEALTH CARE EDUCATION/TRAINING PROGRAM

## 2022-06-23 PROCEDURE — 99204 OFFICE O/P NEW MOD 45 MIN: CPT | Mod: S$PBB,,, | Performed by: STUDENT IN AN ORGANIZED HEALTH CARE EDUCATION/TRAINING PROGRAM

## 2022-06-23 PROCEDURE — 99204 PR OFFICE/OUTPT VISIT, NEW, LEVL IV, 45-59 MIN: ICD-10-PCS | Mod: S$PBB,,, | Performed by: STUDENT IN AN ORGANIZED HEALTH CARE EDUCATION/TRAINING PROGRAM

## 2022-06-23 PROCEDURE — 3008F PR BODY MASS INDEX (BMI) DOCUMENTED: ICD-10-PCS | Mod: CPTII,,, | Performed by: STUDENT IN AN ORGANIZED HEALTH CARE EDUCATION/TRAINING PROGRAM

## 2022-06-23 PROCEDURE — 99999 PR PBB SHADOW E&M-EST. PATIENT-LVL V: ICD-10-PCS | Mod: PBBFAC,,, | Performed by: STUDENT IN AN ORGANIZED HEALTH CARE EDUCATION/TRAINING PROGRAM

## 2022-06-23 PROCEDURE — 3078F PR MOST RECENT DIASTOLIC BLOOD PRESSURE < 80 MM HG: ICD-10-PCS | Mod: CPTII,,, | Performed by: STUDENT IN AN ORGANIZED HEALTH CARE EDUCATION/TRAINING PROGRAM

## 2022-06-23 RX ORDER — SODIUM CHLORIDE 9 MG/ML
INJECTION, SOLUTION INTRAVENOUS CONTINUOUS
Status: CANCELLED | OUTPATIENT
Start: 2022-06-23

## 2022-06-23 RX ORDER — SODIUM CHLORIDE 0.9 % (FLUSH) 0.9 %
10 SYRINGE (ML) INJECTION
Status: CANCELLED | OUTPATIENT
Start: 2022-06-23

## 2022-06-24 RX ORDER — MONTELUKAST SODIUM 4 MG/1
1 TABLET, CHEWABLE ORAL 2 TIMES DAILY
Qty: 60 TABLET | Refills: 0 | Status: SHIPPED | OUTPATIENT
Start: 2022-06-24 | End: 2022-07-26 | Stop reason: SDUPTHER

## 2022-06-24 RX ORDER — SUCRALFATE 1 G/1
1 TABLET ORAL 4 TIMES DAILY
Qty: 120 TABLET | Refills: 0 | Status: SHIPPED | OUTPATIENT
Start: 2022-06-24 | End: 2022-07-24

## 2022-06-28 NOTE — DISCHARGE INSTRUCTIONS
BEFORE THE PROCEDURE:    REPORT ANY CHANGE IN YOUR PHYSICAL CONDITION TO YOUR DOCTOR IMMEDIATELY.  SELF ISOLATE AND CHECK TEMPERATURE DAILY, IF TEMP OVER 100, CALL PHYSICIAN IMMEDIATELY.  TRY TO REFRAIN FROM SMOKING AND ALCOHOL 72 HOURS BEFORE YOUR PROCEDURE.   DO NOT EAT OR DRINK ANYTHING AFTER MIDNIGHT THE NIGHT BEFORE YOUR PROCEDURE.  NO MAKE UP, NAIL POLISH OR JEWELRY.      SOMEONE WILL CALL YOU THE DAY BEFORE YOUR PROCEDURE WITH A CHECK-IN TIME FOR YOUR PROCEDURE.    DAY OF YOUR PROCEDURE:    TAKE BLOOD PRESSURE MEDICATIONS THE MORNING OF YOUR PROCEDURE, WITH SMALL SIPS WATER, AS DIRECTED BY YOUR PHYSICIAN.   DO NOT TAKE ANY DIABETIC MEDICATIONS UNLESS DIRECTED TO DO SO BY YOUR PHYSICIAN.   CONTACT LENSES AND DENTURES MUST BE REMOVED.  A RESPONSIBLE ADULT MUST ACCOMPANY YOU HOME UPON DISCHARGE.   ONLY 1 VISITOR ALLOWED PER ROOM.     *RETURN FOR A COVID TEST ON -Tuesday July 5, 2022- BETWEEN 8A-11A. CHECK IN AT REGISTRATION.    MASKS ARE OPTIONAL.    YOUR THOUGHTS AND OPINIONS HELP US TO BETTER SERVE YOU.     PLEASE PARTICIPATE IN SURVEYS ABOUT YOUR CARE.    THANK YOU FOR CHOOSING [facility].

## 2022-06-29 ENCOUNTER — HOSPITAL ENCOUNTER (OUTPATIENT)
Dept: PREADMISSION TESTING | Facility: HOSPITAL | Age: 32
Discharge: HOME | End: 2022-06-29
Attending: STUDENT IN AN ORGANIZED HEALTH CARE EDUCATION/TRAINING PROGRAM
Payer: MEDICAID

## 2022-06-29 VITALS — BODY MASS INDEX: 20.32 KG/M2 | HEIGHT: 64 IN | WEIGHT: 119 LBS

## 2022-06-29 DIAGNOSIS — Z01.818 PRE-OP TESTING: ICD-10-CM

## 2022-06-29 RX ORDER — ZOLPIDEM TARTRATE 10 MG/1
5 TABLET ORAL NIGHTLY PRN
COMMUNITY
End: 2022-08-02

## 2022-07-04 ENCOUNTER — HOSPITAL ENCOUNTER (EMERGENCY)
Facility: HOSPITAL | Age: 32
Discharge: HOME | End: 2022-07-04
Attending: STUDENT IN AN ORGANIZED HEALTH CARE EDUCATION/TRAINING PROGRAM
Payer: MEDICAID

## 2022-07-04 VITALS
WEIGHT: 119 LBS | BODY MASS INDEX: 20.32 KG/M2 | HEART RATE: 111 BPM | HEIGHT: 64 IN | DIASTOLIC BLOOD PRESSURE: 82 MMHG | SYSTOLIC BLOOD PRESSURE: 114 MMHG | OXYGEN SATURATION: 98 % | TEMPERATURE: 99 F | RESPIRATION RATE: 18 BRPM

## 2022-07-04 DIAGNOSIS — K08.89 DENTALGIA: Primary | ICD-10-CM

## 2022-07-04 PROCEDURE — 25000003 PHARM REV CODE 250: Performed by: CLINICAL NURSE SPECIALIST

## 2022-07-04 PROCEDURE — 99284 EMERGENCY DEPT VISIT MOD MDM: CPT | Mod: 25

## 2022-07-04 RX ORDER — LIDOCAINE HYDROCHLORIDE 20 MG/ML
15 SOLUTION OROPHARYNGEAL ONCE
Status: COMPLETED | OUTPATIENT
Start: 2022-07-04 | End: 2022-07-04

## 2022-07-04 RX ORDER — CLINDAMYCIN HYDROCHLORIDE 150 MG/1
300 CAPSULE ORAL 4 TIMES DAILY
Qty: 40 CAPSULE | Refills: 0 | Status: SHIPPED | OUTPATIENT
Start: 2022-07-04 | End: 2022-07-09

## 2022-07-04 RX ORDER — LIDOCAINE HYDROCHLORIDE 20 MG/ML
SOLUTION OROPHARYNGEAL
Qty: 120 ML | Refills: 0 | Status: SHIPPED | OUTPATIENT
Start: 2022-07-04 | End: 2022-08-12 | Stop reason: ALTCHOICE

## 2022-07-04 RX ORDER — ACETAMINOPHEN AND CODEINE PHOSPHATE 300; 30 MG/1; MG/1
1 TABLET ORAL EVERY 6 HOURS PRN
Qty: 5 TABLET | Refills: 0 | Status: SHIPPED | OUTPATIENT
Start: 2022-07-04 | End: 2022-07-14

## 2022-07-04 RX ORDER — HYDROCODONE BITARTRATE AND ACETAMINOPHEN 5; 325 MG/1; MG/1
1 TABLET ORAL
Status: COMPLETED | OUTPATIENT
Start: 2022-07-04 | End: 2022-07-04

## 2022-07-04 RX ORDER — CLINDAMYCIN HYDROCHLORIDE 300 MG/1
300 CAPSULE ORAL
Status: COMPLETED | OUTPATIENT
Start: 2022-07-04 | End: 2022-07-04

## 2022-07-04 RX ADMIN — CLINDAMYCIN HYDROCHLORIDE 300 MG: 300 CAPSULE ORAL at 06:07

## 2022-07-04 RX ADMIN — LIDOCAINE HYDROCHLORIDE 15 ML: 20 SOLUTION ORAL; TOPICAL at 06:07

## 2022-07-04 RX ADMIN — HYDROCODONE BITARTRATE AND ACETAMINOPHEN 1 TABLET: 5; 325 TABLET ORAL at 06:07

## 2022-07-04 NOTE — ED PROVIDER NOTES
"Encounter Date: 7/4/2022       History     Chief Complaint   Patient presents with    Dental Pain     Pt stated that since Saturday she has been experiencing upper right dental pain due to fx wisdom tooth. Presents to ED with complaints of pain/ facial swelling.      Seema Myrick is an 31 y.o. female who complains of right upper dental pain with facial swelling since Saturday.  Patient states she has been tried over-the-counter medications with no relief.  History of anxiety        Review of patient's allergies indicates:   Allergen Reactions    Halothane Other (See Comments)     Malignant Hyperthermia    Mucinex [guaifenesin] Other (See Comments)     Asthma attack     Succinylcholine Other (See Comments)     Malignant Hyperthermia    Amoxil [amoxicillin] Other (See Comments)     Makes my stomach hurt    Bactrim [sulfamethoxazole-trimethoprim] Nausea And Vomiting    Compazine [prochlorperazine edisylate]     Doxycycline     Ibuprofen      Chest pains    Neurontin [gabapentin] Other (See Comments)     Patient reports if makes her whole body numb    Pseudoephedrine     Succinylcholine chloride      Other reaction(s): Unknown    Toradol [ketorolac]      MIGRAINES    Tramadol      MIGRAINES    Trintellix [vortioxetine]      "PASS OUT AND CHEST PAIN"    Zofran [ondansetron hcl (pf)] Other (See Comments)     States: "It gives me migraines"       Brompheniramine-pseudoeph-dm Rash     Other reaction(s): Unknown  Other reaction(s): Unknown    Capzasin [capsaicin] Rash    Levaquin [levofloxacin] Rash    Trimethadione/paramethadione Rash     Past Medical History:   Diagnosis Date    Anemia     Anxiety     Arthritis     states to lower back from falling in 2008    Asthma     Blood in stool 06/2022    Carpal tunnel syndrome     Carpal tunnel syndrome on right     EMERITA (cerebral atherosclerosis)     Demyelinating disease of central nervous system     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    Fibrosis, breast     Gallstones     Gallstones     GERD (gastroesophageal reflux disease)     History of chest pain     IBS (irritable bowel syndrome)     Insomnia     Kidney stones     Kidney stones     Malignant hyperthermia     age2    Migraine headache     N&V (nausea and vomiting) 06/2022    Osteoporosis     PFO (patent foramen ovale)     PFO (patent foramen ovale)     not flowing correctly (size 5)    Small intestine disorder     twisted    Thyroid disease      Past Surgical History:   Procedure Laterality Date    ADENOIDECTOMY      ADENOIDECTOMY      ARTHROSCOPY OF KNEE Left 8/7/2020    Procedure: ARTHROSCOPY, KNEE;  Surgeon: Mal Ayala MD;  Location: [facility];  Service: Orthopedics;  Laterality: Left;    CARPAL TUNNEL RELEASE Right 10/29/2019    Procedure: RELEASE, CARPAL TUNNEL;  Surgeon: Mal Ayala MD;  Location: [facility];  Service: Orthopedics;  Laterality: Right;    CHOLECYSTECTOMY      ESOPHAGOGASTRODUODENOSCOPY N/A 12/5/2019    Procedure: EGD (ESOPHAGOGASTRODUODENOSCOPY);  Surgeon: Claudio Enriquez MD;  Location: [facility];  Service: Endoscopy;  Laterality: N/A;    injections to neck      LITHOTRIPSY      TONSILLECTOMY      TYMPANOSTOMY TUBE PLACEMENT       Family History   Problem Relation Age of Onset    Thyroid disease Mother     Carpal tunnel syndrome Mother     Cholecystitis Father     Kidney disease Father     Gout Father     No Known Problems Paternal Grandmother     No Known Problems Paternal Grandfather     No Known Problems Maternal Grandmother     Brain cancer Maternal Grandfather     Brain cancer Paternal Aunt     No Known Problems Sister      Social History     Tobacco Use    Smoking status: Never Smoker    Smokeless tobacco: Never Used   Substance Use Topics    Alcohol use: No    Drug use: No     Review of Systems   Constitutional: Negative for fever.   HENT: Positive for dental problem. Negative for sore throat.     Respiratory: Negative for shortness of breath.    Cardiovascular: Negative for chest pain.   Gastrointestinal: Negative for nausea.   Genitourinary: Negative for dysuria.   Musculoskeletal: Negative for back pain.   Skin: Negative for rash.   Neurological: Negative for weakness.   Hematological: Does not bruise/bleed easily.   All other systems reviewed and are negative.      Physical Exam     Initial Vitals [07/04/22 1820]   BP Pulse Resp Temp SpO2   114/82 (!) 111 18 98.5 °F (36.9 °C) 98 %      MAP       --         Physical Exam    Nursing note and vitals reviewed.  Constitutional: She appears well-developed and well-nourished.   HENT:   Head: Normocephalic and atraumatic.   Mouth/Throat: Abnormal dentition. Dental abscesses and dental caries present.   Eyes: Pupils are equal, round, and reactive to light.   Neck:   Normal range of motion.  Cardiovascular: Normal rate and regular rhythm.   Pulmonary/Chest: Breath sounds normal.   Abdominal: Abdomen is soft. Bowel sounds are normal.   Musculoskeletal:         General: Normal range of motion.      Cervical back: Normal range of motion.     Neurological: She is alert and oriented to person, place, and time.   Skin: Skin is warm and dry.   Psychiatric: She has a normal mood and affect.         ED Course   Procedures  Labs Reviewed - No data to display       Imaging Results    None          Medications   HYDROcodone-acetaminophen 5-325 mg per tablet 1 tablet (1 tablet Oral Given 7/4/22 1828)   LIDOcaine HCl 2% oral solution 15 mL (15 mLs Mucous Membrane Given 7/4/22 1828)   clindamycin capsule 300 mg (300 mg Oral Given 7/4/22 1828)     Medical Decision Making:   Differential Diagnosis:   Dental abscess, dental caries                      Clinical Impression:   Final diagnoses:  [K08.89] Dentalgia (Primary)          ED Disposition Condition    Discharge Stable        ED Prescriptions     Medication Sig Dispense Start Date End Date Auth. Provider    clindamycin  (CLEOCIN) 150 MG capsule Take 2 capsules (300 mg total) by mouth 4 (four) times daily. for 5 days 40 capsule 7/4/2022 7/9/2022 Camille Pinto NP    LIDOcaine HCl 2% (LIDOCAINE VISCOUS) 2 % Soln by Mucous Membrane route every 3 (three) hours as needed (dental pain). Swish and spit 15ml 120 mL 7/4/2022  Camille Pinto NP    acetaminophen-codeine 300-30mg (TYLENOL #3) 300-30 mg Tab Take 1 tablet by mouth every 6 (six) hours as needed (dental pain). 5 tablet 7/4/2022 7/14/2022 Camille Pinto NP        Follow-up Information    None          Camille Pinto NP  07/04/22 1928

## 2022-07-05 ENCOUNTER — LAB VISIT (OUTPATIENT)
Dept: LAB | Facility: HOSPITAL | Age: 32
End: 2022-07-05
Attending: STUDENT IN AN ORGANIZED HEALTH CARE EDUCATION/TRAINING PROGRAM
Payer: MEDICAID

## 2022-07-05 ENCOUNTER — TELEPHONE (OUTPATIENT)
Dept: SURGERY | Facility: CLINIC | Age: 32
End: 2022-07-05
Payer: MEDICAID

## 2022-07-05 DIAGNOSIS — Z01.818 PRE-OP TESTING: ICD-10-CM

## 2022-07-05 LAB — SARS-COV-2 RNA RESP QL NAA+PROBE: NOT DETECTED

## 2022-07-05 PROCEDURE — U0002 COVID-19 LAB TEST NON-CDC: HCPCS | Performed by: STUDENT IN AN ORGANIZED HEALTH CARE EDUCATION/TRAINING PROGRAM

## 2022-07-05 NOTE — H&P
[facility] H&P      Consult: Reflux   Consulting Service: Dr. Hoover    Chief Complaint: Heartburn     HPI: Pt is a 31 y.o.   female extensive PMH who presents with abdominal pain, nausea, and vomiting not associated with PO intake.  Not exacerbated by any particular type of food.  Symptoms are chronic, with EGD in 2019 suggesting distal duodenal tortuosity contributing to symptoms.  Symptoms associated with epigastric pain.   Protonix gives some relief but she has run out.  Having normal bowel movements without rectal bleeding, prolonged constipation, or diarrhea.         PMH:   Past Medical History:   Diagnosis Date    Anemia     Anxiety     Arthritis     states to lower back from falling in 2008    Asthma     Blood in stool 06/2022    Carpal tunnel syndrome     Carpal tunnel syndrome on right     EMERITA (cerebral atherosclerosis)     Demyelinating disease of central nervous system     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    Fibrosis, breast     Gallstones     Gallstones     GERD (gastroesophageal reflux disease)     History of chest pain     IBS (irritable bowel syndrome)     Insomnia     Kidney stones     Kidney stones     Malignant hyperthermia     age2    Migraine headache     N&V (nausea and vomiting) 06/2022    Osteoporosis     PFO (patent foramen ovale)     PFO (patent foramen ovale)     not flowing correctly (size 5)    Small intestine disorder     twisted    Thyroid disease        PSH:   Past Surgical History:   Procedure Laterality Date    ADENOIDECTOMY      ADENOIDECTOMY      ARTHROSCOPY OF KNEE Left 8/7/2020    Procedure: ARTHROSCOPY, KNEE;  Surgeon: Mal Ayala MD;  Location: [facility];  Service: Orthopedics;  Laterality: Left;    CARPAL TUNNEL RELEASE Right 10/29/2019    Procedure: RELEASE, CARPAL TUNNEL;  Surgeon: Mal Ayala MD;  Location: [facility];  Service: Orthopedics;  Laterality: Right;    CHOLECYSTECTOMY       "ESOPHAGOGASTRODUODENOSCOPY N/A 12/5/2019    Procedure: EGD (ESOPHAGOGASTRODUODENOSCOPY);  Surgeon: Claudio Enriquez MD;  Location: [facility];  Service: Endoscopy;  Laterality: N/A;    injections to neck      LITHOTRIPSY      TONSILLECTOMY      TYMPANOSTOMY TUBE PLACEMENT         Meds: See medication list;  No anticoagulation    ALL:   Review of patient's allergies indicates:   Allergen Reactions    Halothane Other (See Comments)     Malignant Hyperthermia    Mucinex [guaifenesin] Other (See Comments)     Asthma attack     Succinylcholine Other (See Comments)     Malignant Hyperthermia    Amoxil [amoxicillin] Other (See Comments)     Makes my stomach hurt    Bactrim [sulfamethoxazole-trimethoprim] Nausea And Vomiting    Compazine [prochlorperazine edisylate]     Doxycycline     Ibuprofen      Chest pains    Neurontin [gabapentin] Other (See Comments)     Patient reports if makes her whole body numb    Pseudoephedrine     Succinylcholine chloride      Other reaction(s): Unknown    Toradol [ketorolac]      MIGRAINES    Tramadol      MIGRAINES    Trintellix [vortioxetine]      "PASS OUT AND CHEST PAIN"    Zofran [ondansetron hcl (pf)] Other (See Comments)     States: "It gives me migraines"       Brompheniramine-pseudoeph-dm Rash     Other reaction(s): Unknown  Other reaction(s): Unknown    Capzasin [capsaicin] Rash    Levaquin [levofloxacin] Rash    Trimethadione/paramethadione Rash       FHX: non contributory    SOC:   Social History     Socioeconomic History    Marital status: Single    Number of children: 0    Years of education: 12th   Tobacco Use    Smoking status: Never Smoker    Smokeless tobacco: Never Used   Substance and Sexual Activity    Alcohol use: No    Drug use: No    Sexual activity: Not Currently     Partners: Male     Birth control/protection: None     Social Determinants of Health     Financial Resource Strain: Low Risk     Difficulty of Paying Living Expenses: " "Not hard at all   Food Insecurity: No Food Insecurity    Worried About Running Out of Food in the Last Year: Never true    Ran Out of Food in the Last Year: Never true   Transportation Needs: No Transportation Needs    Lack of Transportation (Medical): No    Lack of Transportation (Non-Medical): No   Physical Activity: Sufficiently Active    Days of Exercise per Week: 5 days    Minutes of Exercise per Session: 60 min   Stress: Stress Concern Present    Feeling of Stress : Very much   Social Connections: Unknown    Frequency of Communication with Friends and Family: Twice a week    Frequency of Social Gatherings with Friends and Family: Once a week    Active Member of Clubs or Organizations: No    Attends Club or Organization Meetings: Never    Marital Status: Never    Housing Stability: Low Risk     Unable to Pay for Housing in the Last Year: No    Number of Places Lived in the Last Year: 2    Unstable Housing in the Last Year: No       ROS: As per HPI    Physical Exam:  /72 (BP Location: Left arm, Patient Position: Sitting, BP Method: Medium (Automatic))   Pulse 98   Ht 5' 4" (1.626 m)   Wt 54.1 kg (119 lb 4.3 oz)   LMP 06/23/2022 (Exact Date)   SpO2 99%   BMI 20.47 kg/m²   GEN: NAD, A/Ox4  HEENT: Anicteric sclera, EOMI  Neck: Supple, no LAD  CV: RRR  Pulm: CTAB; breaths equal and nonlabored  ABD: Soft, NTTP, ND  Ext: no c/c/e      Imaging:   [place] Upper GI 2019:  FINDINGS:  The patient swallowed barium and air crystals without difficulty.     Normal peristalsis is noted throughout the esophagus.  The mucosal pattern is normal.     There is no evidence for gastroesophageal reflux or hiatal hernia.     The stomach is normally distensible without evidence for mass or ulceration.     There is a small diverticulum projecting off the medial aspect of the 2nd portion of the duodenum.        A/P: Pt is a 31 y.o.   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 Rondon due to h/o PFO   --CBC, CMP, CXR, EKG, COVID  -Pt with h/o malignant hypothermia - anesthesia aware.  Has tolerated MAC during previous procedures           Zelda Lang MD  [phone]

## 2022-07-05 NOTE — TELEPHONE ENCOUNTER
Called patient to remind her about procedure tomorrow. I let her know that the hospital will call with a time today to be there in the morning.

## 2022-07-06 ENCOUNTER — HOSPITAL ENCOUNTER (OUTPATIENT)
Facility: HOSPITAL | Age: 32
Discharge: HOME | End: 2022-07-08
Attending: STUDENT IN AN ORGANIZED HEALTH CARE EDUCATION/TRAINING PROGRAM | Admitting: STUDENT IN AN ORGANIZED HEALTH CARE EDUCATION/TRAINING PROGRAM
Payer: MEDICAID

## 2022-07-06 DIAGNOSIS — Z87.442 HISTORY OF KIDNEY STONES: ICD-10-CM

## 2022-07-06 DIAGNOSIS — K59.03 DRUG-INDUCED CONSTIPATION: ICD-10-CM

## 2022-07-06 DIAGNOSIS — R10.13 EPIGASTRIC PAIN: ICD-10-CM

## 2022-07-06 DIAGNOSIS — K21.9 GASTROESOPHAGEAL REFLUX DISEASE, UNSPECIFIED WHETHER ESOPHAGITIS PRESENT: ICD-10-CM

## 2022-07-06 DIAGNOSIS — N30.00 ACUTE CYSTITIS WITHOUT HEMATURIA: Primary | ICD-10-CM

## 2022-07-06 DIAGNOSIS — R11.2 INTRACTABLE NAUSEA AND VOMITING: ICD-10-CM

## 2022-07-06 LAB
ALBUMIN SERPL BCP-MCNC: 3 G/DL (ref 3.5–5.2)
ALP SERPL-CCNC: 69 U/L (ref 55–135)
ALT SERPL W/O P-5'-P-CCNC: 14 U/L (ref 10–44)
AMPHET+METHAMPHET UR QL: NEGATIVE
ANION GAP SERPL CALC-SCNC: 2 MMOL/L (ref 8–16)
AST SERPL-CCNC: 16 U/L (ref 10–40)
B-HCG UR QL: NEGATIVE
BACTERIA #/AREA URNS HPF: ABNORMAL /HPF
BARBITURATES UR QL SCN>200 NG/ML: NEGATIVE
BASOPHILS # BLD AUTO: 0.04 K/UL (ref 0–0.2)
BASOPHILS NFR BLD: 0.4 % (ref 0–1.9)
BENZODIAZ UR QL SCN>200 NG/ML: NEGATIVE
BILIRUB SERPL-MCNC: 0.3 MG/DL (ref 0.1–1)
BILIRUB UR QL STRIP: NEGATIVE
BUN SERPL-MCNC: 7 MG/DL (ref 6–20)
BZE UR QL SCN: NEGATIVE
CALCIUM SERPL-MCNC: 8.5 MG/DL (ref 8.7–10.5)
CANNABINOIDS UR QL SCN: NEGATIVE
CHLORIDE SERPL-SCNC: 108 MMOL/L (ref 95–110)
CLARITY UR: CLEAR
CO2 SERPL-SCNC: 27 MMOL/L (ref 23–29)
COLOR UR: YELLOW
CREAT SERPL-MCNC: 0.8 MG/DL (ref 0.5–1.4)
CREAT UR-MCNC: 30.2 MG/DL (ref 15–325)
CRP SERPL-MCNC: 6.51 MG/DL (ref 0–0.75)
DIFFERENTIAL METHOD: ABNORMAL
EOSINOPHIL # BLD AUTO: 0 K/UL (ref 0–0.5)
EOSINOPHIL NFR BLD: 0 % (ref 0–8)
ERYTHROCYTE [DISTWIDTH] IN BLOOD BY AUTOMATED COUNT: 12.6 % (ref 11.5–14.5)
EST. GFR  (AFRICAN AMERICAN): >60 ML/MIN/1.73 M^2
EST. GFR  (NON AFRICAN AMERICAN): >60 ML/MIN/1.73 M^2
GLUCOSE SERPL-MCNC: 104 MG/DL (ref 70–110)
GLUCOSE UR QL STRIP: NEGATIVE
HCT VFR BLD AUTO: 34.8 % (ref 37–48.5)
HGB BLD-MCNC: 11.5 G/DL (ref 12–16)
HGB UR QL STRIP: ABNORMAL
HYALINE CASTS #/AREA URNS LPF: 0.8 /LPF
IMM GRANULOCYTES # BLD AUTO: 0.08 K/UL (ref 0–0.04)
IMM GRANULOCYTES NFR BLD AUTO: 0.7 % (ref 0–0.5)
KETONES UR QL STRIP: NEGATIVE
LACTATE SERPL-SCNC: 1.1 MMOL/L (ref 0.5–2.2)
LEUKOCYTE ESTERASE UR QL STRIP: ABNORMAL
LYMPHOCYTES # BLD AUTO: 1.4 K/UL (ref 1–4.8)
LYMPHOCYTES NFR BLD: 12.7 % (ref 18–48)
MCH RBC QN AUTO: 30.7 PG (ref 27–31)
MCHC RBC AUTO-ENTMCNC: 33 G/DL (ref 32–36)
MCV RBC AUTO: 93 FL (ref 82–98)
METHADONE UR QL SCN>300 NG/ML: NEGATIVE
MICROSCOPIC COMMENT: ABNORMAL
MONOCYTES # BLD AUTO: 1.1 K/UL (ref 0.3–1)
MONOCYTES NFR BLD: 9.8 % (ref 4–15)
NEUTROPHILS # BLD AUTO: 8.3 K/UL (ref 1.8–7.7)
NEUTROPHILS NFR BLD: 76.4 % (ref 38–73)
NITRITE UR QL STRIP: NEGATIVE
NRBC BLD-RTO: 0 /100 WBC
OPIATES UR QL SCN: NEGATIVE
PCP UR QL SCN>25 NG/ML: NEGATIVE
PH UR STRIP: 6 [PH] (ref 5–8)
PLATELET # BLD AUTO: 167 K/UL (ref 150–450)
PMV BLD AUTO: 9.4 FL (ref 9.2–12.9)
POTASSIUM SERPL-SCNC: 3.6 MMOL/L (ref 3.5–5.1)
PROT SERPL-MCNC: 6.9 G/DL (ref 6–8.4)
PROT UR QL STRIP: NEGATIVE
RBC # BLD AUTO: 3.75 M/UL (ref 4–5.4)
RBC #/AREA URNS HPF: 1 /HPF (ref 0–4)
SODIUM SERPL-SCNC: 137 MMOL/L (ref 136–145)
SP GR UR STRIP: <=1.005 (ref 1–1.03)
SQUAMOUS #/AREA URNS HPF: 2 /HPF
TOXICOLOGY INFORMATION: NORMAL
URN SPEC COLLECT METH UR: ABNORMAL
UROBILINOGEN UR STRIP-ACNC: NEGATIVE EU/DL
WBC # BLD AUTO: 10.91 K/UL (ref 3.9–12.7)
WBC #/AREA URNS HPF: 62 /HPF (ref 0–5)

## 2022-07-06 PROCEDURE — 87086 URINE CULTURE/COLONY COUNT: CPT | Performed by: STUDENT IN AN ORGANIZED HEALTH CARE EDUCATION/TRAINING PROGRAM

## 2022-07-06 PROCEDURE — 83605 ASSAY OF LACTIC ACID: CPT | Performed by: STUDENT IN AN ORGANIZED HEALTH CARE EDUCATION/TRAINING PROGRAM

## 2022-07-06 PROCEDURE — 25000003 PHARM REV CODE 250: Performed by: STUDENT IN AN ORGANIZED HEALTH CARE EDUCATION/TRAINING PROGRAM

## 2022-07-06 PROCEDURE — 87077 CULTURE AEROBIC IDENTIFY: CPT | Performed by: STUDENT IN AN ORGANIZED HEALTH CARE EDUCATION/TRAINING PROGRAM

## 2022-07-06 PROCEDURE — 36415 COLL VENOUS BLD VENIPUNCTURE: CPT | Performed by: STUDENT IN AN ORGANIZED HEALTH CARE EDUCATION/TRAINING PROGRAM

## 2022-07-06 PROCEDURE — 81025 URINE PREGNANCY TEST: CPT | Performed by: STUDENT IN AN ORGANIZED HEALTH CARE EDUCATION/TRAINING PROGRAM

## 2022-07-06 PROCEDURE — 80307 DRUG TEST PRSMV CHEM ANLYZR: CPT | Performed by: STUDENT IN AN ORGANIZED HEALTH CARE EDUCATION/TRAINING PROGRAM

## 2022-07-06 PROCEDURE — 87040 BLOOD CULTURE FOR BACTERIA: CPT | Mod: 59 | Performed by: STUDENT IN AN ORGANIZED HEALTH CARE EDUCATION/TRAINING PROGRAM

## 2022-07-06 PROCEDURE — 85025 COMPLETE CBC W/AUTO DIFF WBC: CPT | Performed by: STUDENT IN AN ORGANIZED HEALTH CARE EDUCATION/TRAINING PROGRAM

## 2022-07-06 PROCEDURE — G0378 HOSPITAL OBSERVATION PER HR: HCPCS

## 2022-07-06 PROCEDURE — 87088 URINE BACTERIA CULTURE: CPT | Performed by: STUDENT IN AN ORGANIZED HEALTH CARE EDUCATION/TRAINING PROGRAM

## 2022-07-06 PROCEDURE — G0378 HOSPITAL OBSERVATION PER HR: HCPCS | Mod: OBSCO

## 2022-07-06 PROCEDURE — 80053 COMPREHEN METABOLIC PANEL: CPT | Performed by: STUDENT IN AN ORGANIZED HEALTH CARE EDUCATION/TRAINING PROGRAM

## 2022-07-06 PROCEDURE — 87186 SC STD MICRODIL/AGAR DIL: CPT | Performed by: STUDENT IN AN ORGANIZED HEALTH CARE EDUCATION/TRAINING PROGRAM

## 2022-07-06 PROCEDURE — 81000 URINALYSIS NONAUTO W/SCOPE: CPT | Mod: 59 | Performed by: STUDENT IN AN ORGANIZED HEALTH CARE EDUCATION/TRAINING PROGRAM

## 2022-07-06 PROCEDURE — 86140 C-REACTIVE PROTEIN: CPT | Performed by: STUDENT IN AN ORGANIZED HEALTH CARE EDUCATION/TRAINING PROGRAM

## 2022-07-06 RX ORDER — TALC
6 POWDER (GRAM) TOPICAL NIGHTLY PRN
Status: DISCONTINUED | OUTPATIENT
Start: 2022-07-06 | End: 2022-07-08 | Stop reason: HOSPADM

## 2022-07-06 RX ORDER — SODIUM CHLORIDE 0.9 % (FLUSH) 0.9 %
10 SYRINGE (ML) INJECTION
Status: DISCONTINUED | OUTPATIENT
Start: 2022-07-06 | End: 2022-07-06 | Stop reason: HOSPADM

## 2022-07-06 RX ORDER — FAMOTIDINE 20 MG/1
40 TABLET, FILM COATED ORAL 2 TIMES DAILY
Status: DISCONTINUED | OUTPATIENT
Start: 2022-07-06 | End: 2022-07-08 | Stop reason: HOSPADM

## 2022-07-06 RX ORDER — CLINDAMYCIN PHOSPHATE 600 MG/50ML
600 INJECTION, SOLUTION INTRAVENOUS
Status: CANCELLED | OUTPATIENT
Start: 2022-07-06

## 2022-07-06 RX ORDER — LIDOCAINE HYDROCHLORIDE 10 MG/ML
1 INJECTION, SOLUTION EPIDURAL; INFILTRATION; INTRACAUDAL; PERINEURAL ONCE
Status: DISCONTINUED | OUTPATIENT
Start: 2022-07-06 | End: 2022-07-08 | Stop reason: HOSPADM

## 2022-07-06 RX ORDER — ACETAMINOPHEN 325 MG/1
650 TABLET ORAL EVERY 4 HOURS PRN
Status: DISCONTINUED | OUTPATIENT
Start: 2022-07-06 | End: 2022-07-08

## 2022-07-06 RX ORDER — HYDROCODONE BITARTRATE AND ACETAMINOPHEN 5; 325 MG/1; MG/1
1 TABLET ORAL EVERY 6 HOURS PRN
Status: DISCONTINUED | OUTPATIENT
Start: 2022-07-06 | End: 2022-07-07

## 2022-07-06 RX ORDER — ACETAMINOPHEN 325 MG/1
650 TABLET ORAL EVERY 8 HOURS PRN
Status: DISCONTINUED | OUTPATIENT
Start: 2022-07-06 | End: 2022-07-08 | Stop reason: HOSPADM

## 2022-07-06 RX ORDER — SUCRALFATE 1 G/1
1 TABLET ORAL
Status: DISCONTINUED | OUTPATIENT
Start: 2022-07-06 | End: 2022-07-08 | Stop reason: HOSPADM

## 2022-07-06 RX ORDER — SODIUM CHLORIDE 9 MG/ML
INJECTION, SOLUTION INTRAVENOUS CONTINUOUS
Status: DISCONTINUED | OUTPATIENT
Start: 2022-07-06 | End: 2022-07-08 | Stop reason: HOSPADM

## 2022-07-06 RX ORDER — PROMETHAZINE HYDROCHLORIDE 12.5 MG/1
12.5 TABLET ORAL EVERY 6 HOURS PRN
Status: DISCONTINUED | OUTPATIENT
Start: 2022-07-06 | End: 2022-07-08 | Stop reason: HOSPADM

## 2022-07-06 RX ORDER — SODIUM CHLORIDE 9 MG/ML
INJECTION, SOLUTION INTRAVENOUS CONTINUOUS
Status: DISCONTINUED | OUTPATIENT
Start: 2022-07-06 | End: 2022-07-06

## 2022-07-06 RX ADMIN — ACETAMINOPHEN 650 MG: 325 TABLET ORAL at 09:07

## 2022-07-06 RX ADMIN — SUCRALFATE 1 G: 1 TABLET ORAL at 09:07

## 2022-07-06 RX ADMIN — SODIUM CHLORIDE 1000 ML: 0.9 INJECTION, SOLUTION INTRAVENOUS at 07:07

## 2022-07-06 RX ADMIN — HYDROCODONE BITARTRATE AND ACETAMINOPHEN 1 TABLET: 5; 325 TABLET ORAL at 04:07

## 2022-07-06 RX ADMIN — SODIUM CHLORIDE: 0.9 INJECTION, SOLUTION INTRAVENOUS at 11:07

## 2022-07-06 RX ADMIN — PROMETHAZINE HYDROCHLORIDE 12.5 MG: 12.5 TABLET ORAL at 11:07

## 2022-07-06 RX ADMIN — FAMOTIDINE 40 MG: 20 TABLET ORAL at 09:07

## 2022-07-06 RX ADMIN — SUCRALFATE 1 G: 1 TABLET ORAL at 04:07

## 2022-07-06 RX ADMIN — PROMETHAZINE HYDROCHLORIDE 12.5 MG: 12.5 TABLET ORAL at 04:07

## 2022-07-06 RX ADMIN — HYDROCODONE BITARTRATE AND ACETAMINOPHEN 1 TABLET: 5; 325 TABLET ORAL at 10:07

## 2022-07-06 NOTE — PLAN OF CARE
"Noted temp. 102.2 oral and heart rate 103-115, pt c/o n/v this morning, abd pain pointing to epigastric area, admits "I'm here because of nausea and vomiting and stomach pain, my small intestines are twisted". Notified dr lang, cancel EGD for today due to temp., hold pt there and Dr Lang will come and see pt. Notified pt and pts mother.  "

## 2022-07-06 NOTE — PLAN OF CARE
"0800AM-AWAITING ORDERS FROM PHYSICIAN WHO IS IN SURGERY. "OK TO HAVE WATER" PER DR PANDYA. WATER TO PT, TOLERATING WELL. NO VOMITING NOTED.    0930AM-ASSISTED UP TO BATHROOM PER STEVEN JORDAN.    1110-TRANSPORTED PT FROM 5TH FLOOR TO 6TH FLOOR VIA STRETCHER IN STABLE CONDITION, REPORT GIVEN TO KULWANT ARMAS.  "

## 2022-07-06 NOTE — PLAN OF CARE
[facility]  Discharge Assessment    Primary Care Provider: Stacy Hoover DO     Discharge Assessment (most recent)     BRIEF DISCHARGE ASSESSMENT - 07/06/22 4132        Discharge Planning    Assessment Type Discharge Planning Assessment     Resource/Environmental Concerns none     Support Systems Parent;Friends/neighbors     Equipment Currently Used at Home nebulizer     Current Living Arrangements home/apartment/condo     Patient/Family Anticipates Transition to home     Patient/Family Anticipated Services at Transition none;other (see comments)   Attempted to offer the patient's educational services, she declined.    DME Needed Upon Discharge  other (see comments)   TBD    Discharge Plan A Home     Discharge Plan B Home             Brief discharge assessment is completed. Spoke to the patient in her room. She was awake, alert, and oriented to the questions being asked. The patient lives with her friend and her family. She stated that she has support from her friend and parents. The patient receives SSI benefits. She has a nebulizer machine that she uses for her care. The patient stated that she sees a specialist for her anxiety locate in [place], [place]. She indicated that she has an upcoming appointment scheduled for August 18th.  Contact information was left in the patient's in case she had any questions or concerns.     Will continue to monitor until the patient is discharged.

## 2022-07-07 PROBLEM — N39.0 URINARY TRACT INFECTION WITHOUT HEMATURIA: Status: ACTIVE | Noted: 2022-07-07

## 2022-07-07 PROBLEM — R50.9 FEVER OF UNKNOWN ORIGIN: Status: ACTIVE | Noted: 2022-07-07

## 2022-07-07 LAB
ALBUMIN SERPL BCP-MCNC: 2.2 G/DL (ref 3.5–5.2)
ALP SERPL-CCNC: 49 U/L (ref 55–135)
ALT SERPL W/O P-5'-P-CCNC: 10 U/L (ref 10–44)
ANION GAP SERPL CALC-SCNC: 5 MMOL/L (ref 8–16)
AST SERPL-CCNC: 12 U/L (ref 10–40)
BASOPHILS # BLD AUTO: 0.04 K/UL (ref 0–0.2)
BASOPHILS NFR BLD: 0.5 % (ref 0–1.9)
BILIRUB SERPL-MCNC: 0.3 MG/DL (ref 0.1–1)
BUN SERPL-MCNC: 3 MG/DL (ref 6–20)
CALCIUM SERPL-MCNC: 7.6 MG/DL (ref 8.7–10.5)
CHLORIDE SERPL-SCNC: 113 MMOL/L (ref 95–110)
CO2 SERPL-SCNC: 23 MMOL/L (ref 23–29)
CREAT SERPL-MCNC: 0.5 MG/DL (ref 0.5–1.4)
DIFFERENTIAL METHOD: ABNORMAL
EOSINOPHIL # BLD AUTO: 0 K/UL (ref 0–0.5)
EOSINOPHIL NFR BLD: 0.5 % (ref 0–8)
ERYTHROCYTE [DISTWIDTH] IN BLOOD BY AUTOMATED COUNT: 12.5 % (ref 11.5–14.5)
EST. GFR  (AFRICAN AMERICAN): >60 ML/MIN/1.73 M^2
EST. GFR  (NON AFRICAN AMERICAN): >60 ML/MIN/1.73 M^2
GLUCOSE SERPL-MCNC: 102 MG/DL (ref 70–110)
HCT VFR BLD AUTO: 30.1 % (ref 37–48.5)
HGB BLD-MCNC: 10.1 G/DL (ref 12–16)
IMM GRANULOCYTES # BLD AUTO: 0.07 K/UL (ref 0–0.04)
IMM GRANULOCYTES NFR BLD AUTO: 0.9 % (ref 0–0.5)
LIPASE SERPL-CCNC: 64 U/L (ref 23–300)
LYMPHOCYTES # BLD AUTO: 1.7 K/UL (ref 1–4.8)
LYMPHOCYTES NFR BLD: 21.3 % (ref 18–48)
MCH RBC QN AUTO: 30.9 PG (ref 27–31)
MCHC RBC AUTO-ENTMCNC: 33.6 G/DL (ref 32–36)
MCV RBC AUTO: 92 FL (ref 82–98)
MONOCYTES # BLD AUTO: 1.1 K/UL (ref 0.3–1)
MONOCYTES NFR BLD: 13.8 % (ref 4–15)
NEUTROPHILS # BLD AUTO: 4.9 K/UL (ref 1.8–7.7)
NEUTROPHILS NFR BLD: 63 % (ref 38–73)
NRBC BLD-RTO: 0 /100 WBC
PLATELET # BLD AUTO: 144 K/UL (ref 150–450)
PMV BLD AUTO: 9.3 FL (ref 9.2–12.9)
POTASSIUM SERPL-SCNC: 3.3 MMOL/L (ref 3.5–5.1)
PROT SERPL-MCNC: 5.4 G/DL (ref 6–8.4)
RBC # BLD AUTO: 3.27 M/UL (ref 4–5.4)
SODIUM SERPL-SCNC: 141 MMOL/L (ref 136–145)
WBC # BLD AUTO: 7.73 K/UL (ref 3.9–12.7)

## 2022-07-07 PROCEDURE — G0378 HOSPITAL OBSERVATION PER HR: HCPCS

## 2022-07-07 PROCEDURE — 96372 THER/PROPH/DIAG INJ SC/IM: CPT | Performed by: STUDENT IN AN ORGANIZED HEALTH CARE EDUCATION/TRAINING PROGRAM

## 2022-07-07 PROCEDURE — 83690 ASSAY OF LIPASE: CPT | Performed by: STUDENT IN AN ORGANIZED HEALTH CARE EDUCATION/TRAINING PROGRAM

## 2022-07-07 PROCEDURE — 80053 COMPREHEN METABOLIC PANEL: CPT | Performed by: STUDENT IN AN ORGANIZED HEALTH CARE EDUCATION/TRAINING PROGRAM

## 2022-07-07 PROCEDURE — 36415 COLL VENOUS BLD VENIPUNCTURE: CPT | Performed by: STUDENT IN AN ORGANIZED HEALTH CARE EDUCATION/TRAINING PROGRAM

## 2022-07-07 PROCEDURE — 85025 COMPLETE CBC W/AUTO DIFF WBC: CPT | Performed by: STUDENT IN AN ORGANIZED HEALTH CARE EDUCATION/TRAINING PROGRAM

## 2022-07-07 PROCEDURE — 25000003 PHARM REV CODE 250: Performed by: STUDENT IN AN ORGANIZED HEALTH CARE EDUCATION/TRAINING PROGRAM

## 2022-07-07 PROCEDURE — 63600175 PHARM REV CODE 636 W HCPCS: Performed by: STUDENT IN AN ORGANIZED HEALTH CARE EDUCATION/TRAINING PROGRAM

## 2022-07-07 RX ORDER — DICYCLOMINE HYDROCHLORIDE 10 MG/1
10 CAPSULE ORAL 4 TIMES DAILY
Status: DISCONTINUED | OUTPATIENT
Start: 2022-07-07 | End: 2022-07-08 | Stop reason: HOSPADM

## 2022-07-07 RX ORDER — IBUPROFEN 400 MG/1
400 TABLET ORAL EVERY 6 HOURS PRN
Status: DISCONTINUED | OUTPATIENT
Start: 2022-07-07 | End: 2022-07-08 | Stop reason: HOSPADM

## 2022-07-07 RX ORDER — HYDROCODONE BITARTRATE AND ACETAMINOPHEN 10; 325 MG/1; MG/1
1 TABLET ORAL EVERY 6 HOURS PRN
Status: DISCONTINUED | OUTPATIENT
Start: 2022-07-07 | End: 2022-07-08

## 2022-07-07 RX ORDER — MEROPENEM AND SODIUM CHLORIDE 1 G/50ML
1 INJECTION, SOLUTION INTRAVENOUS
Status: DISCONTINUED | OUTPATIENT
Start: 2022-07-07 | End: 2022-07-08

## 2022-07-07 RX ORDER — ENOXAPARIN SODIUM 100 MG/ML
40 INJECTION SUBCUTANEOUS EVERY 24 HOURS
Status: DISCONTINUED | OUTPATIENT
Start: 2022-07-07 | End: 2022-07-08 | Stop reason: HOSPADM

## 2022-07-07 RX ADMIN — MEROPENEM AND SODIUM CHLORIDE 1 G: 1 INJECTION, SOLUTION INTRAVENOUS at 09:07

## 2022-07-07 RX ADMIN — HYDROCODONE BITARTRATE AND ACETAMINOPHEN 1 TABLET: 5; 325 TABLET ORAL at 03:07

## 2022-07-07 RX ADMIN — ENOXAPARIN SODIUM 40 MG: 40 INJECTION SUBCUTANEOUS at 09:07

## 2022-07-07 RX ADMIN — SODIUM CHLORIDE: 0.9 INJECTION, SOLUTION INTRAVENOUS at 12:07

## 2022-07-07 RX ADMIN — PROMETHAZINE HYDROCHLORIDE 12.5 MG: 12.5 TABLET ORAL at 12:07

## 2022-07-07 RX ADMIN — SUCRALFATE 1 G: 1 TABLET ORAL at 11:07

## 2022-07-07 RX ADMIN — FAMOTIDINE 40 MG: 20 TABLET ORAL at 09:07

## 2022-07-07 RX ADMIN — MEROPENEM AND SODIUM CHLORIDE 1 G: 1 INJECTION, SOLUTION INTRAVENOUS at 11:07

## 2022-07-07 RX ADMIN — DICYCLOMINE HYDROCHLORIDE 10 MG: 10 CAPSULE ORAL at 12:07

## 2022-07-07 RX ADMIN — ACETAMINOPHEN 650 MG: 325 TABLET ORAL at 05:07

## 2022-07-07 RX ADMIN — SUCRALFATE 1 G: 1 TABLET ORAL at 04:07

## 2022-07-07 RX ADMIN — HYDROCODONE BITARTRATE AND ACETAMINOPHEN 1 TABLET: 5; 325 TABLET ORAL at 05:07

## 2022-07-07 RX ADMIN — DICYCLOMINE HYDROCHLORIDE 10 MG: 10 CAPSULE ORAL at 09:07

## 2022-07-07 RX ADMIN — PROMETHAZINE HYDROCHLORIDE 12.5 MG: 12.5 TABLET ORAL at 05:07

## 2022-07-07 RX ADMIN — DICYCLOMINE HYDROCHLORIDE 10 MG: 10 CAPSULE ORAL at 04:07

## 2022-07-07 RX ADMIN — PROMETHAZINE HYDROCHLORIDE 12.5 MG: 12.5 TABLET ORAL at 09:07

## 2022-07-07 RX ADMIN — SODIUM CHLORIDE: 0.9 INJECTION, SOLUTION INTRAVENOUS at 10:07

## 2022-07-07 RX ADMIN — Medication 6 MG: at 09:07

## 2022-07-07 RX ADMIN — SUCRALFATE 1 G: 1 TABLET ORAL at 05:07

## 2022-07-07 RX ADMIN — DICYCLOMINE HYDROCHLORIDE 10 MG: 10 CAPSULE ORAL at 08:07

## 2022-07-07 RX ADMIN — SUCRALFATE 1 G: 1 TABLET ORAL at 09:07

## 2022-07-07 RX ADMIN — HYDROCODONE BITARTRATE AND ACETAMINOPHEN 1 TABLET: 10; 325 TABLET ORAL at 09:07

## 2022-07-07 RX ADMIN — FAMOTIDINE 40 MG: 20 TABLET ORAL at 08:07

## 2022-07-07 NOTE — ASSESSMENT & PLAN NOTE
Urine culture +adriel for Ecoli   Will confer with pharmacy for abx regimen based on extensive allergies

## 2022-07-07 NOTE — HPI
30yo female with complex medical history who presented to day surgery for EGD due to refractory abdominal pain, nausea, and vomiting.  In day surgery, Tmax 102 and tachycardic.  Procedure cancelled and patient admitted to med/surg for workup.

## 2022-07-07 NOTE — PROGRESS NOTES
Pharmacist Renal Dose Adjustment Note    Seema Myrick is a 31 y.o. female being treated with the medication meropenem    Patient Data:    Vital Signs (Most Recent):  Temp: 97.7 °F (36.5 °C) (07/07/22 0809)  Pulse: 70 (07/07/22 0809)  Resp: 20 (07/07/22 0809)  BP: (!) 90/51 (07/07/22 0809)  SpO2: 98 % (07/07/22 0809)   Vital Signs (72h Range):  Temp:  [97.7 °F (36.5 °C)-102.2 °F (39 °C)]   Pulse:  []   Resp:  [18-20]   BP: ()/(51-63)   SpO2:  [92 %-99 %]      Recent Labs   Lab 07/06/22  1013   CREATININE 0.8     Serum creatinine: 0.8 mg/dL 07/06/22 1013  Estimated creatinine clearance: 86.1 mL/min    Medication:meropenem dose: 1 G frequency q12h will be changed to medication:meropenem dose:1 G frequency:q8h    Pharmacist's Name: Karly Richter  Pharmacist's Extension: [phone]

## 2022-07-07 NOTE — NURSING
Spoke with  about pt. Low blood pressures of 79/52 and 73/51, new order for 1 Liter bolus of Normal Saline now x1, read back orders, will continue to monitor.

## 2022-07-07 NOTE — ASSESSMENT & PLAN NOTE
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

## 2022-07-07 NOTE — PLAN OF CARE
Patient complains of abdominal discomfort through the night, prn pain medication administered as ordered, nausea medication administered as ordered, plan of care discussed with pt., clear liquid diet, continuous IV fluids as ordered, takes meds whole, able to make needs known, no SOB or respiratory distress noted through the night, continent, ambulates independently, RA, call bell in reach, instructed to call for needs/assistance, will continue to monitor.

## 2022-07-07 NOTE — H&P
[facility]  General Surgery  History & Physical    Patient Name: Seema Myrick  MRN: [id number]  Admission Date: 7/6/2022  Attending Physician: Zelda Lang MD   Primary Care Provider: Stacy Hoover DO    Patient information was obtained from patient, past medical records and ER records.     Subjective:     Chief Complaint/Reason for Admission: Fever and abdominal pain      History of Present Illness: 30yo female with complex medical history who presented to day surgery for EGD due to refractory abdominal pain, nausea, and vomiting.  In day surgery, Tmax 102 and tachycardic.  Procedure cancelled and patient admitted to [facility] for workup.        No current facility-administered medications on file prior to encounter.     Current Outpatient Medications on File Prior to Encounter   Medication Sig    AJOVY AUTOINJECTOR 225 mg/1.5 mL autoinjector INJECT 1 PEN INJECTOR UNDER THE SKIN ONCE A MONTH    albuterol (PROVENTIL/VENTOLIN HFA) 90 mcg/actuation inhaler INHALE 2 PUFFS BY MOUTH EVERY 6 HOURS AS NEEDED FOR WHEEZING OR SHORTNESS OF BREATH    brivaracetam (BRIVIACT) 50 mg Tab Take 50 mg by mouth 2 (two) times daily.    colestipoL (COLESTID) 1 gram Tab Take 1 tablet (1 g total) by mouth 2 (two) times daily.    famotidine (PEPCID) 20 MG tablet Take 1 tablet (20 mg total) by mouth 2 (two) times daily as needed for Heartburn.    LORazepam (ATIVAN) 0.5 MG tablet Take 0.5 mg by mouth every 8 (eight) hours as needed for Anxiety.     meclizine (ANTIVERT) 25 mg tablet TAKE 1 TABLET(25 MG) BY MOUTH THREE TIMES DAILY AS NEEDED FOR DIZZINESS    OSCIMIN SL 0.125 mg Subl TAKE 1 TABLET BY MOUTH EVERY 4 HOURS AS NEEDED FOR CRAMP LIKE PAIN    pantoprazole (PROTONIX) 40 MG tablet Take 1 tablet (40 mg total) by mouth 2 (two) times daily.    promethazine (PHENERGAN) 12.5 MG Tab Take 1 tablet (12.5 mg total) by mouth 4 (four) times daily.    rimegepant (NURTEC) 75 mg odt Take 75 mg by mouth once as needed  "for Migraine. Place ODT tablet on the tongue; alternatively the ODT tablet may be placed under the tongue    sucralfate (CARAFATE) 1 gram tablet Take 1 tablet (1 g total) by mouth 4 (four) times daily.    tamsulosin (FLOMAX) 0.4 mg Cap TAKE 1 CAPSULE(0.4 MG) BY MOUTH EVERY DAY    tiZANidine (ZANAFLEX) 4 MG tablet Take 4 mg by mouth every 6 (six) hours as needed (take on mon, wed, fri).    BABY ASPIRIN ORAL Take by mouth.    fremanezumab-vfrm (AJOVY AUTOINJECTOR) 225 mg/1.5 mL autoinjector Inject 225 mg into the skin every 28 days.       Review of patient's allergies indicates:   Allergen Reactions    Halothane Other (See Comments)     Malignant Hyperthermia    Mucinex [guaifenesin] Other (See Comments)     Asthma attack     Succinylcholine Other (See Comments)     Malignant Hyperthermia    Amoxil [amoxicillin] Other (See Comments)     Makes my stomach hurt    Bactrim [sulfamethoxazole-trimethoprim] Nausea And Vomiting    Compazine [prochlorperazine edisylate]     Doxycycline     Ibuprofen      Chest pains    Neurontin [gabapentin] Other (See Comments)     Patient reports if makes her whole body numb    Pseudoephedrine     Succinylcholine chloride      Other reaction(s): Unknown    Toradol [ketorolac]      MIGRAINES    Tramadol      MIGRAINES    Trintellix [vortioxetine]      "PASS OUT AND CHEST PAIN"    Zofran [ondansetron hcl (pf)] Other (See Comments)     States: "It gives me migraines"       Brompheniramine-pseudoeph-dm Rash     Other reaction(s): Unknown  Other reaction(s): Unknown    Capzasin [capsaicin] Rash    Levaquin [levofloxacin] Rash    Trimethadione/paramethadione Rash       Past Medical History:   Diagnosis Date    Anemia     Anxiety     Arthritis     states to lower back from falling in 2008    Asthma     Blood in stool 06/2022    Carpal tunnel syndrome     Carpal tunnel syndrome on right     EMERITA (cerebral atherosclerosis)     Demyelinating disease of central nervous " system     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    Fibrosis, breast     Gallstones     Gallstones     GERD (gastroesophageal reflux disease)     History of chest pain     IBS (irritable bowel syndrome)     Insomnia     Kidney stones     Kidney stones     Malignant hyperthermia     age2    Migraine headache     N&V (nausea and vomiting) 06/2022    Osteoporosis     PFO (patent foramen ovale)     PFO (patent foramen ovale)     not flowing correctly (size 5)    Small intestine disorder     twisted    Thyroid disease      Past Surgical History:   Procedure Laterality Date    ADENOIDECTOMY      ADENOIDECTOMY      ARTHROSCOPY OF KNEE Left 8/7/2020    Procedure: ARTHROSCOPY, KNEE;  Surgeon: Mal Ayala MD;  Location: [facility];  Service: Orthopedics;  Laterality: Left;    CARPAL TUNNEL RELEASE Right 10/29/2019    Procedure: RELEASE, CARPAL TUNNEL;  Surgeon: Mal Ayala MD;  Location: [facility];  Service: Orthopedics;  Laterality: Right;    CHOLECYSTECTOMY      ESOPHAGOGASTRODUODENOSCOPY N/A 12/5/2019    Procedure: EGD (ESOPHAGOGASTRODUODENOSCOPY);  Surgeon: Claudio Enriquez MD;  Location[facility];  Service: Endoscopy;  Laterality: N/A;    injections to neck      LITHOTRIPSY      TONSILLECTOMY      TYMPANOSTOMY TUBE PLACEMENT       Family History       Problem Relation (Age of Onset)    Brain cancer Maternal Grandfather, Paternal Aunt    Carpal tunnel syndrome Mother    Cholecystitis Father    Gout Father    Kidney disease Father    No Known Problems Paternal Grandmother, Paternal Grandfather, Maternal Grandmother, Sister    Thyroid disease Mother          Tobacco Use    Smoking status: Never Smoker    Smokeless tobacco: Never Used   Substance and Sexual Activity    Alcohol use: No    Drug use: No    Sexual activity: Not Currently     Partners: Male     Birth control/protection: None     Review of Systems   Constitutional:  Positive for appetite change, fatigue and  fever. Negative for activity change, chills and diaphoresis.   Cardiovascular:  Negative for chest pain.   Gastrointestinal:  Positive for abdominal pain, nausea and vomiting. Negative for abdominal distention, anal bleeding, blood in stool, constipation, diarrhea and rectal pain.   Genitourinary:  Negative for dysuria, flank pain and frequency.   Objective:     Vital Signs (Most Recent):  Temp: 97.7 °F (36.5 °C) (07/07/22 0809)  Pulse: 70 (07/07/22 0809)  Resp: 20 (07/07/22 0809)  BP: (!) 90/51 (07/07/22 0809)  SpO2: 98 % (07/07/22 0809)   Vital Signs (24h Range):  Temp:  [97.7 °F (36.5 °C)-100.9 °F (38.3 °C)] 97.7 °F (36.5 °C)  Pulse:  [70-97] 70  Resp:  [18-20] 20  SpO2:  [92 %-99 %] 98 %  BP: ()/(51-60) 90/51     Weight: 54 kg (119 lb)  Body mass index is 20.43 kg/m².    Physical Exam  Constitutional:       General: She is not in acute distress.     Appearance: She is ill-appearing. She is not toxic-appearing.   HENT:      Mouth/Throat:      Comments: Multiple teeth in various stages of decay.  No obvious dental abscess detected.  Cardiovascular:      Rate and Rhythm: Normal rate and regular rhythm.      Comments: SBP 80s - asymptomatic   Pulmonary:      Effort: Pulmonary effort is normal. No respiratory distress.   Abdominal:      General: Abdomen is flat. There is no distension.      Palpations: Abdomen is soft.      Tenderness: There is no abdominal tenderness. There is no guarding or rebound.   Musculoskeletal:         General: No swelling.   Skin:     General: Skin is warm.      Capillary Refill: Capillary refill takes less than 2 seconds.   Neurological:      Mental Status: She is alert. Mental status is at baseline.       Significant Labs:  I have reviewed all pertinent lab results within the past 24 hours.  CBC:   Recent Labs   Lab 07/07/22  0928   WBC 7.73   RBC 3.27*   HGB 10.1*   HCT 30.1*   *   MCV 92   MCH 30.9   MCHC 33.6     CMP:   Recent Labs   Lab 07/06/22  1013      CALCIUM  8.5*   ALBUMIN 3.0*   PROT 6.9      K 3.6   CO2 27      BUN 7   CREATININE 0.8   ALKPHOS 69   ALT 14   AST 16   BILITOT 0.3       Significant Diagnostics:  I have reviewed all pertinent imaging results/findings within the past 24 hours.      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

## 2022-07-07 NOTE — SUBJECTIVE & OBJECTIVE
No current facility-administered medications on file prior to encounter.     Current Outpatient Medications on File Prior to Encounter   Medication Sig    AJOVY AUTOINJECTOR 225 mg/1.5 mL autoinjector INJECT 1 PEN INJECTOR UNDER THE SKIN ONCE A MONTH    albuterol (PROVENTIL/VENTOLIN HFA) 90 mcg/actuation inhaler INHALE 2 PUFFS BY MOUTH EVERY 6 HOURS AS NEEDED FOR WHEEZING OR SHORTNESS OF BREATH    brivaracetam (BRIVIACT) 50 mg Tab Take 50 mg by mouth 2 (two) times daily.    colestipoL (COLESTID) 1 gram Tab Take 1 tablet (1 g total) by mouth 2 (two) times daily.    famotidine (PEPCID) 20 MG tablet Take 1 tablet (20 mg total) by mouth 2 (two) times daily as needed for Heartburn.    LORazepam (ATIVAN) 0.5 MG tablet Take 0.5 mg by mouth every 8 (eight) hours as needed for Anxiety.     meclizine (ANTIVERT) 25 mg tablet TAKE 1 TABLET(25 MG) BY MOUTH THREE TIMES DAILY AS NEEDED FOR DIZZINESS    OSCIMIN SL 0.125 mg Subl TAKE 1 TABLET BY MOUTH EVERY 4 HOURS AS NEEDED FOR CRAMP LIKE PAIN    pantoprazole (PROTONIX) 40 MG tablet Take 1 tablet (40 mg total) by mouth 2 (two) times daily.    promethazine (PHENERGAN) 12.5 MG Tab Take 1 tablet (12.5 mg total) by mouth 4 (four) times daily.    rimegepant (NURTEC) 75 mg odt Take 75 mg by mouth once as needed for Migraine. Place ODT tablet on the tongue; alternatively the ODT tablet may be placed under the tongue    sucralfate (CARAFATE) 1 gram tablet Take 1 tablet (1 g total) by mouth 4 (four) times daily.    tamsulosin (FLOMAX) 0.4 mg Cap TAKE 1 CAPSULE(0.4 MG) BY MOUTH EVERY DAY    tiZANidine (ZANAFLEX) 4 MG tablet Take 4 mg by mouth every 6 (six) hours as needed (take on mon, wed, fri).    BABY ASPIRIN ORAL Take by mouth.    fremanezumab-vfrm (AJOVY AUTOINJECTOR) 225 mg/1.5 mL autoinjector Inject 225 mg into the skin every 28 days.       Review of patient's allergies indicates:   Allergen Reactions    Halothane Other (See Comments)     Malignant Hyperthermia    Mucinex  "[guaifenesin] Other (See Comments)     Asthma attack     Succinylcholine Other (See Comments)     Malignant Hyperthermia    Amoxil [amoxicillin] Other (See Comments)     Makes my stomach hurt    Bactrim [sulfamethoxazole-trimethoprim] Nausea And Vomiting    Compazine [prochlorperazine edisylate]     Doxycycline     Ibuprofen      Chest pains    Neurontin [gabapentin] Other (See Comments)     Patient reports if makes her whole body numb    Pseudoephedrine     Succinylcholine chloride      Other reaction(s): Unknown    Toradol [ketorolac]      MIGRAINES    Tramadol      MIGRAINES    Trintellix [vortioxetine]      "PASS OUT AND CHEST PAIN"    Zofran [ondansetron hcl (pf)] Other (See Comments)     States: "It gives me migraines"       Brompheniramine-pseudoeph-dm Rash     Other reaction(s): Unknown  Other reaction(s): Unknown    Capzasin [capsaicin] Rash    Levaquin [levofloxacin] Rash    Trimethadione/paramethadione Rash       Past Medical History:   Diagnosis Date    Anemia     Anxiety     Arthritis     states to lower back from falling in 2008    Asthma     Blood in stool 06/2022    Carpal tunnel syndrome     Carpal tunnel syndrome on right     EMERITA (cerebral atherosclerosis)     Demyelinating disease of central nervous system     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    Fibrosis, breast     Gallstones     Gallstones     GERD (gastroesophageal reflux disease)     History of chest pain     IBS (irritable bowel syndrome)     Insomnia     Kidney stones     Kidney stones     Malignant hyperthermia     age2    Migraine headache     N&V (nausea and vomiting) 06/2022    Osteoporosis     PFO (patent foramen ovale)     PFO (patent foramen ovale)     not flowing correctly (size 5)    Small intestine disorder     twisted    Thyroid disease      Past Surgical History:   Procedure Laterality Date    ADENOIDECTOMY      ADENOIDECTOMY      ARTHROSCOPY OF KNEE Left 8/7/2020    Procedure: ARTHROSCOPY, KNEE;  Surgeon: Mal" BOUCHRA Ayala MD;  Location: [facility];  Service: Orthopedics;  Laterality: Left;    CARPAL TUNNEL RELEASE Right 10/29/2019    Procedure: RELEASE, CARPAL TUNNEL;  Surgeon: Mal Ayala MD;  Location: [facility] OR;  Service: Orthopedics;  Laterality: Right;    CHOLECYSTECTOMY      ESOPHAGOGASTRODUODENOSCOPY N/A 12/5/2019    Procedure: EGD (ESOPHAGOGASTRODUODENOSCOPY);  Surgeon: Claudio Enriquez MD;  Location: [facility];  Service: Endoscopy;  Laterality: N/A;    injections to neck      LITHOTRIPSY      TONSILLECTOMY      TYMPANOSTOMY TUBE PLACEMENT       Family History       Problem Relation (Age of Onset)    Brain cancer Maternal Grandfather, Paternal Aunt    Carpal tunnel syndrome Mother    Cholecystitis Father    Gout Father    Kidney disease Father    No Known Problems Paternal Grandmother, Paternal Grandfather, Maternal Grandmother, Sister    Thyroid disease Mother          Tobacco Use    Smoking status: Never Smoker    Smokeless tobacco: Never Used   Substance and Sexual Activity    Alcohol use: No    Drug use: No    Sexual activity: Not Currently     Partners: Male     Birth control/protection: None     Review of Systems   Constitutional:  Positive for appetite change, fatigue and fever. Negative for activity change, chills and diaphoresis.   Cardiovascular:  Negative for chest pain.   Gastrointestinal:  Positive for abdominal pain, nausea and vomiting. Negative for abdominal distention, anal bleeding, blood in stool, constipation, diarrhea and rectal pain.   Genitourinary:  Negative for dysuria, flank pain and frequency.   Objective:     Vital Signs (Most Recent):  Temp: 97.7 °F (36.5 °C) (07/07/22 0809)  Pulse: 70 (07/07/22 0809)  Resp: 20 (07/07/22 0809)  BP: (!) 90/51 (07/07/22 0809)  SpO2: 98 % (07/07/22 0809)   Vital Signs (24h Range):  Temp:  [97.7 °F (36.5 °C)-100.9 °F (38.3 °C)] 97.7 °F (36.5 °C)  Pulse:  [70-97] 70  Resp:  [18-20] 20  SpO2:  [92 %-99 %] 98 %  BP: ()/(51-60) 90/51     Weight: 54 kg  (119 lb)  Body mass index is 20.43 kg/m².    Physical Exam  Constitutional:       General: She is not in acute distress.     Appearance: She is ill-appearing. She is not toxic-appearing.   HENT:      Mouth/Throat:      Comments: Multiple teeth in various stages of decay.  No obvious dental abscess detected.  Cardiovascular:      Rate and Rhythm: Normal rate and regular rhythm.      Comments: SBP 80s - asymptomatic   Pulmonary:      Effort: Pulmonary effort is normal. No respiratory distress.   Abdominal:      General: Abdomen is flat. There is no distension.      Palpations: Abdomen is soft.      Tenderness: There is no abdominal tenderness. There is no guarding or rebound.   Musculoskeletal:         General: No swelling.   Skin:     General: Skin is warm.      Capillary Refill: Capillary refill takes less than 2 seconds.   Neurological:      Mental Status: She is alert. Mental status is at baseline.       Significant Labs:  I have reviewed all pertinent lab results within the past 24 hours.  CBC:   Recent Labs   Lab 07/07/22  0928   WBC 7.73   RBC 3.27*   HGB 10.1*   HCT 30.1*   *   MCV 92   MCH 30.9   MCHC 33.6     CMP:   Recent Labs   Lab 07/06/22  1013      CALCIUM 8.5*   ALBUMIN 3.0*   PROT 6.9      K 3.6   CO2 27      BUN 7   CREATININE 0.8   ALKPHOS 69   ALT 14   AST 16   BILITOT 0.3       Significant Diagnostics:  I have reviewed all pertinent imaging results/findings within the past 24 hours.

## 2022-07-08 VITALS
OXYGEN SATURATION: 97 % | WEIGHT: 119 LBS | TEMPERATURE: 99 F | HEART RATE: 80 BPM | DIASTOLIC BLOOD PRESSURE: 58 MMHG | RESPIRATION RATE: 18 BRPM | HEIGHT: 64 IN | BODY MASS INDEX: 20.32 KG/M2 | SYSTOLIC BLOOD PRESSURE: 107 MMHG

## 2022-07-08 PROBLEM — K59.03 DRUG-INDUCED CONSTIPATION: Status: ACTIVE | Noted: 2022-07-08

## 2022-07-08 PROCEDURE — G0378 HOSPITAL OBSERVATION PER HR: HCPCS

## 2022-07-08 PROCEDURE — 63600175 PHARM REV CODE 636 W HCPCS: Performed by: STUDENT IN AN ORGANIZED HEALTH CARE EDUCATION/TRAINING PROGRAM

## 2022-07-08 PROCEDURE — G0378 HOSPITAL OBSERVATION PER HR: HCPCS | Mod: OBSCO

## 2022-07-08 PROCEDURE — 25000003 PHARM REV CODE 250: Performed by: STUDENT IN AN ORGANIZED HEALTH CARE EDUCATION/TRAINING PROGRAM

## 2022-07-08 RX ORDER — BISACODYL 10 MG
10 SUPPOSITORY, RECTAL RECTAL DAILY PRN
Status: DISCONTINUED | OUTPATIENT
Start: 2022-07-08 | End: 2022-07-08 | Stop reason: HOSPADM

## 2022-07-08 RX ORDER — HYDROCODONE BITARTRATE AND ACETAMINOPHEN 7.5; 325 MG/1; MG/1
1 TABLET ORAL EVERY 6 HOURS PRN
Status: DISCONTINUED | OUTPATIENT
Start: 2022-07-08 | End: 2022-07-08 | Stop reason: HOSPADM

## 2022-07-08 RX ORDER — DICYCLOMINE HYDROCHLORIDE 10 MG/1
10 CAPSULE ORAL 4 TIMES DAILY
Qty: 120 CAPSULE | Refills: 0 | Status: SHIPPED | OUTPATIENT
Start: 2022-07-08 | End: 2022-08-07

## 2022-07-08 RX ORDER — POLYETHYLENE GLYCOL 3350 17 G/17G
17 POWDER, FOR SOLUTION ORAL DAILY
Qty: 30 EACH | Refills: 2 | Status: SHIPPED | OUTPATIENT
Start: 2022-07-08 | End: 2022-10-06

## 2022-07-08 RX ORDER — POLYETHYLENE GLYCOL 3350 17 G/17G
17 POWDER, FOR SOLUTION ORAL DAILY
Status: DISCONTINUED | OUTPATIENT
Start: 2022-07-08 | End: 2022-07-08 | Stop reason: HOSPADM

## 2022-07-08 RX ORDER — NITROFURANTOIN 25; 75 MG/1; MG/1
100 CAPSULE ORAL EVERY 12 HOURS
Qty: 14 CAPSULE | Refills: 0 | Status: SHIPPED | OUTPATIENT
Start: 2022-07-08 | End: 2022-07-15

## 2022-07-08 RX ORDER — HYDROCODONE BITARTRATE AND ACETAMINOPHEN 5; 325 MG/1; MG/1
1 TABLET ORAL EVERY 6 HOURS PRN
Qty: 5 TABLET | Refills: 0 | Status: SHIPPED | OUTPATIENT
Start: 2022-07-08 | End: 2022-07-10

## 2022-07-08 RX ORDER — NITROFURANTOIN 25; 75 MG/1; MG/1
100 CAPSULE ORAL EVERY 12 HOURS
Status: DISCONTINUED | OUTPATIENT
Start: 2022-07-08 | End: 2022-07-08 | Stop reason: HOSPADM

## 2022-07-08 RX ADMIN — MEROPENEM AND SODIUM CHLORIDE 1 G: 1 INJECTION, SOLUTION INTRAVENOUS at 03:07

## 2022-07-08 RX ADMIN — DICYCLOMINE HYDROCHLORIDE 10 MG: 10 CAPSULE ORAL at 08:07

## 2022-07-08 RX ADMIN — FAMOTIDINE 40 MG: 20 TABLET ORAL at 08:07

## 2022-07-08 RX ADMIN — SODIUM CHLORIDE: 0.9 INJECTION, SOLUTION INTRAVENOUS at 06:07

## 2022-07-08 RX ADMIN — HYDROCODONE BITARTRATE AND ACETAMINOPHEN 1 TABLET: 10; 325 TABLET ORAL at 08:07

## 2022-07-08 RX ADMIN — PROMETHAZINE HYDROCHLORIDE 12.5 MG: 12.5 TABLET ORAL at 08:07

## 2022-07-08 RX ADMIN — SUCRALFATE 1 G: 1 TABLET ORAL at 06:07

## 2022-07-08 NOTE — ASSESSMENT & PLAN NOTE
Large stool volume on CT scan  Colace and Miralax upon discharge   Suppository today   Discontinue norco

## 2022-07-08 NOTE — SUBJECTIVE & OBJECTIVE
"Interval History:   Patient seen and examined.  NAEON.  Afebrile overnight.  VSS;   Tolerated cereal this AM.  C/o baseline nausea and RUQ abdominal pain.  No BM since Sunday.      Medications:  Continuous Infusions:   sodium chloride 0.9% 150 mL/hr at 07/08/22 0632     Scheduled Meds:   dicyclomine  10 mg Oral QID    enoxaparin  40 mg Subcutaneous Daily    famotidine  40 mg Oral BID    LIDOcaine (PF) 10 mg/ml (1%)  1 mL Other Once    nitrofurantoin (macrocrystal-monohydrate)  100 mg Oral Q12H    polyethylene glycol  17 g Oral Daily    potassium phosphate (monobasic)  500 mg Oral Daily    sucralfate  1 g Oral QID (AC & HS)     PRN Meds:acetaminophen, bisacodyL, HYDROcodone-acetaminophen, ibuprofen, melatonin, promethazine     Review of patient's allergies indicates:   Allergen Reactions    Halothane Other (See Comments)     Malignant Hyperthermia    Mucinex [guaifenesin] Other (See Comments)     Asthma attack     Succinylcholine Other (See Comments)     Malignant Hyperthermia    Amoxil [amoxicillin] Other (See Comments)     Makes my stomach hurt    Bactrim [sulfamethoxazole-trimethoprim] Nausea And Vomiting    Compazine [prochlorperazine edisylate]     Doxycycline     Neurontin [gabapentin] Other (See Comments)     Patient reports if makes her whole body numb    Pseudoephedrine     Succinylcholine chloride      Other reaction(s): Unknown    Toradol [ketorolac]      MIGRAINES    Tramadol      MIGRAINES    Trintellix [vortioxetine]      "PASS OUT AND CHEST PAIN"    Zofran [ondansetron hcl (pf)] Other (See Comments)     States: "It gives me migraines"       Brompheniramine-pseudoeph-dm Rash     Other reaction(s): Unknown  Other reaction(s): Unknown    Capzasin [capsaicin] Rash    Levaquin [levofloxacin] Rash    Trimethadione/paramethadione Rash     Objective:     Vital Signs (Most Recent):  Temp: 98.8 °F (37.1 °C) (07/08/22 0725)  Pulse: 80 (07/08/22 0725)  Resp: 18 (07/08/22 0836)  BP: (!) 107/58 (07/08/22 " 0725)  SpO2: 97 % (07/08/22 0725)   Vital Signs (24h Range):  Temp:  [96.6 °F (35.9 °C)-103.1 °F (39.5 °C)] 98.8 °F (37.1 °C)  Pulse:  [] 80  Resp:  [16-20] 18  SpO2:  [96 %-99 %] 97 %  BP: ()/(51-69) 107/58     Weight: 54 kg (119 lb)  Body mass index is 20.43 kg/m².    Intake/Output - Last 3 Shifts         07/06 0700 07/07 0659 07/07 0700 07/08 0659 07/08 0700 07/09 0659    P.O. 8525 242 4962    I.V. (mL/kg) 2050 (38)  1500 (27.8)    Total Intake(mL/kg) 3850 (71.3) 280 (5.2) 2700 (50)    Urine (mL/kg/hr) 2200 (1.7) 2800 (2.2) 2200 (9.3)    Total Output 2200 2800 2200    Net +1650 -2520 +500           Urine Occurrence 3 x              Physical Exam  Constitutional:       General: She is not in acute distress.     Appearance: She is not ill-appearing or toxic-appearing.   Cardiovascular:      Rate and Rhythm: Normal rate and regular rhythm.   Pulmonary:      Effort: No respiratory distress.   Abdominal:      General: Abdomen is flat. There is no distension.      Palpations: Abdomen is soft.      Tenderness: There is no abdominal tenderness. There is no guarding or rebound.   Skin:     General: Skin is warm and dry.      Capillary Refill: Capillary refill takes less than 2 seconds.   Neurological:      Mental Status: She is alert.       Significant Labs:  I have reviewed all pertinent lab results within the past 24 hours.  CBC:   Recent Labs   Lab 07/07/22 0928   WBC 7.73   RBC 3.27*   HGB 10.1*   HCT 30.1*   *   MCV 92   MCH 30.9   MCHC 33.6     CMP:   Recent Labs   Lab 07/07/22 0928      CALCIUM 7.6*   ALBUMIN 2.2*   PROT 5.4*      K 3.3*   CO2 23   *   BUN 3*   CREATININE 0.5   ALKPHOS 49*   ALT 10   AST 12   BILITOT 0.3       Significant Diagnostics:  I have reviewed all pertinent imaging results/findings within the past 24 hours.

## 2022-07-08 NOTE — PROGRESS NOTES
"[facility]  General Surgery  Progress Note    Subjective:     History of Present Illness:  30yo female with complex medical history who presented to day surgery for EGD due to refractory abdominal pain, nausea, and vomiting.  In day surgery, Tmax 102 and tachycardic.  Procedure cancelled and patient admitted to med/surg for workup.        Post-Op Info:  Procedure(s) (LRB):  EGD (ESOPHAGOGASTRODUODENOSCOPY) (N/A)   2 Days Post-Op     Interval History:   Patient seen and examined.  NAEON.  Afebrile overnight.  VSS;   Tolerated cereal this AM.  C/o baseline nausea and RUQ abdominal pain.  No BM since Sunday.      Medications:  Continuous Infusions:   sodium chloride 0.9% 150 mL/hr at 07/08/22 0632     Scheduled Meds:   dicyclomine  10 mg Oral QID    enoxaparin  40 mg Subcutaneous Daily    famotidine  40 mg Oral BID    LIDOcaine (PF) 10 mg/ml (1%)  1 mL Other Once    nitrofurantoin (macrocrystal-monohydrate)  100 mg Oral Q12H    polyethylene glycol  17 g Oral Daily    potassium phosphate (monobasic)  500 mg Oral Daily    sucralfate  1 g Oral QID (AC & HS)     PRN Meds:acetaminophen, bisacodyL, HYDROcodone-acetaminophen, ibuprofen, melatonin, promethazine     Review of patient's allergies indicates:   Allergen Reactions    Halothane Other (See Comments)     Malignant Hyperthermia    Mucinex [guaifenesin] Other (See Comments)     Asthma attack     Succinylcholine Other (See Comments)     Malignant Hyperthermia    Amoxil [amoxicillin] Other (See Comments)     Makes my stomach hurt    Bactrim [sulfamethoxazole-trimethoprim] Nausea And Vomiting    Compazine [prochlorperazine edisylate]     Doxycycline     Neurontin [gabapentin] Other (See Comments)     Patient reports if makes her whole body numb    Pseudoephedrine     Succinylcholine chloride      Other reaction(s): Unknown    Toradol [ketorolac]      MIGRAINES    Tramadol      MIGRAINES    Trintellix [vortioxetine]      "PASS OUT AND CHEST " "PAIN"    Zofran [ondansetron hcl (pf)] Other (See Comments)     States: "It gives me migraines"       Brompheniramine-pseudoeph-dm Rash     Other reaction(s): Unknown  Other reaction(s): Unknown    Capzasin [capsaicin] Rash    Levaquin [levofloxacin] Rash    Trimethadione/paramethadione Rash     Objective:     Vital Signs (Most Recent):  Temp: 98.8 °F (37.1 °C) (07/08/22 0725)  Pulse: 80 (07/08/22 0725)  Resp: 18 (07/08/22 0836)  BP: (!) 107/58 (07/08/22 0725)  SpO2: 97 % (07/08/22 0725)   Vital Signs (24h Range):  Temp:  [96.6 °F (35.9 °C)-103.1 °F (39.5 °C)] 98.8 °F (37.1 °C)  Pulse:  [] 80  Resp:  [16-20] 18  SpO2:  [96 %-99 %] 97 %  BP: ()/(51-69) 107/58     Weight: 54 kg (119 lb)  Body mass index is 20.43 kg/m².    Intake/Output - Last 3 Shifts         07/06 0700  07/07 0659 07/07 0700 07/08 0659 07/08 0700  07/09 0659    P.O. 5070 772 2323    I.V. (mL/kg) 2050 (38)  1500 (27.8)    Total Intake(mL/kg) 3850 (71.3) 280 (5.2) 2700 (50)    Urine (mL/kg/hr) 2200 (1.7) 2800 (2.2) 2200 (9.3)    Total Output 2200 2800 2200    Net +1650 -2520 +500           Urine Occurrence 3 x              Physical Exam  Constitutional:       General: She is not in acute distress.     Appearance: She is not ill-appearing or toxic-appearing.   Cardiovascular:      Rate and Rhythm: Normal rate and regular rhythm.   Pulmonary:      Effort: No respiratory distress.   Abdominal:      General: Abdomen is flat. There is no distension.      Palpations: Abdomen is soft.      Tenderness: There is no abdominal tenderness. There is no guarding or rebound.   Skin:     General: Skin is warm and dry.      Capillary Refill: Capillary refill takes less than 2 seconds.   Neurological:      Mental Status: She is alert.       Significant Labs:  I have reviewed all pertinent lab results within the past 24 hours.  CBC:   Recent Labs   Lab 07/07/22  0928   WBC 7.73   RBC 3.27*   HGB 10.1*   HCT 30.1*   *   MCV 92   MCH 30.9   MCHC " 33.6     CMP:   Recent Labs   Lab 07/07/22  0928      CALCIUM 7.6*   ALBUMIN 2.2*   PROT 5.4*      K 3.3*   CO2 23   *   BUN 3*   CREATININE 0.5   ALKPHOS 49*   ALT 10   AST 12   BILITOT 0.3       Significant Diagnostics:  I have reviewed all pertinent imaging results/findings within the past 24 hours.    Assessment/Plan:     Drug-induced constipation  Large stool volume on CT scan  Colace and Miralax upon discharge   Suppository today   Discontinue norco     Urinary tract infection without hematuria  Urine culture +adriel for Ecoli   Merrem for past 24hrs - afebrile. 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

## 2022-07-08 NOTE — NURSING
Spoke with Dr. Lang per telephone about patient's fever today and her only having tylenol ordered, pt. Also saying that the norco 5 mg are not helping her pain, new orders for norco 10 mg po q 6 hours PRN pain and Ibuprofen 400 mg q 6 hours PRN temp >100.1. Dr. Lang put in her orders. Will continue to monitor.

## 2022-07-08 NOTE — PLAN OF CARE
Plan of care discussed with pt., no complaints of vomiting this shift, pt. Has intermittent nausea, relieved with prn antiemetics, able to take meds whole, complains of abdominal pain, pureed diet, call bell in reach, instructed to call with needs/assistance, continuous IV fluids as ordered, antibiotics as ordered, will continue to monitor.

## 2022-07-08 NOTE — ASSESSMENT & PLAN NOTE
Urine culture +adriel for Ecoli   Merrem for past 24hrs - afebrile. WBC wnl  Switch to Macrobid PO for 7 days  UTI risk reduction education given  Mild hydronephrosis on CT scan with nonobstructing renal stone - pt advised to follow up with Urologist   OK to discharge to home

## 2022-07-09 LAB — BACTERIA UR CULT: ABNORMAL

## 2022-07-12 LAB
BACTERIA BLD CULT: NORMAL
BACTERIA BLD CULT: NORMAL

## 2022-07-16 ENCOUNTER — HOSPITAL ENCOUNTER (EMERGENCY)
Facility: HOSPITAL | Age: 32
Discharge: HOME | End: 2022-07-16
Attending: EMERGENCY MEDICINE
Payer: MEDICAID

## 2022-07-16 VITALS
HEIGHT: 64 IN | BODY MASS INDEX: 20.14 KG/M2 | WEIGHT: 118 LBS | RESPIRATION RATE: 18 BRPM | DIASTOLIC BLOOD PRESSURE: 56 MMHG | SYSTOLIC BLOOD PRESSURE: 91 MMHG | TEMPERATURE: 98 F | HEART RATE: 74 BPM | OXYGEN SATURATION: 98 %

## 2022-07-16 DIAGNOSIS — R11.2 NAUSEA AND VOMITING: ICD-10-CM

## 2022-07-16 DIAGNOSIS — R10.31 RIGHT LOWER QUADRANT ABDOMINAL PAIN: Primary | ICD-10-CM

## 2022-07-16 LAB
ALBUMIN SERPL BCP-MCNC: 3.6 G/DL (ref 3.5–5.2)
ALP SERPL-CCNC: 67 U/L (ref 55–135)
ALT SERPL W/O P-5'-P-CCNC: 20 U/L (ref 10–44)
ANION GAP SERPL CALC-SCNC: 6 MMOL/L (ref 8–16)
AST SERPL-CCNC: 17 U/L (ref 10–40)
B-HCG UR QL: NEGATIVE
BASOPHILS # BLD AUTO: 0.05 K/UL (ref 0–0.2)
BASOPHILS NFR BLD: 0.4 % (ref 0–1.9)
BILIRUB SERPL-MCNC: 0.3 MG/DL (ref 0.1–1)
BILIRUB UR QL STRIP: NEGATIVE
BUN SERPL-MCNC: 18 MG/DL (ref 6–20)
CALCIUM SERPL-MCNC: 8.6 MG/DL (ref 8.7–10.5)
CHLORIDE SERPL-SCNC: 108 MMOL/L (ref 95–110)
CLARITY UR: CLEAR
CO2 SERPL-SCNC: 25 MMOL/L (ref 23–29)
COLOR UR: YELLOW
CREAT SERPL-MCNC: 1 MG/DL (ref 0.5–1.4)
DIFFERENTIAL METHOD: ABNORMAL
EOSINOPHIL # BLD AUTO: 0.1 K/UL (ref 0–0.5)
EOSINOPHIL NFR BLD: 1.1 % (ref 0–8)
ERYTHROCYTE [DISTWIDTH] IN BLOOD BY AUTOMATED COUNT: 12.9 % (ref 11.5–14.5)
EST. GFR  (AFRICAN AMERICAN): >60 ML/MIN/1.73 M^2
EST. GFR  (NON AFRICAN AMERICAN): >60 ML/MIN/1.73 M^2
GLUCOSE SERPL-MCNC: 90 MG/DL (ref 70–110)
GLUCOSE UR QL STRIP: NEGATIVE
HCT VFR BLD AUTO: 39 % (ref 37–48.5)
HGB BLD-MCNC: 12.9 G/DL (ref 12–16)
HGB UR QL STRIP: NEGATIVE
IMM GRANULOCYTES # BLD AUTO: 0.24 K/UL (ref 0–0.04)
IMM GRANULOCYTES NFR BLD AUTO: 2.1 % (ref 0–0.5)
KETONES UR QL STRIP: NEGATIVE
LEUKOCYTE ESTERASE UR QL STRIP: NEGATIVE
LIPASE SERPL-CCNC: 171 U/L (ref 23–300)
LYMPHOCYTES # BLD AUTO: 2.2 K/UL (ref 1–4.8)
LYMPHOCYTES NFR BLD: 18.9 % (ref 18–48)
MCH RBC QN AUTO: 30.4 PG (ref 27–31)
MCHC RBC AUTO-ENTMCNC: 33.1 G/DL (ref 32–36)
MCV RBC AUTO: 92 FL (ref 82–98)
MONOCYTES # BLD AUTO: 0.9 K/UL (ref 0.3–1)
MONOCYTES NFR BLD: 8.1 % (ref 4–15)
NEUTROPHILS # BLD AUTO: 8 K/UL (ref 1.8–7.7)
NEUTROPHILS NFR BLD: 69.4 % (ref 38–73)
NITRITE UR QL STRIP: NEGATIVE
NRBC BLD-RTO: 0 /100 WBC
PH UR STRIP: 5 [PH] (ref 5–8)
PLATELET # BLD AUTO: 411 K/UL (ref 150–450)
PMV BLD AUTO: 9.4 FL (ref 9.2–12.9)
POTASSIUM SERPL-SCNC: 4 MMOL/L (ref 3.5–5.1)
PROT SERPL-MCNC: 7.7 G/DL (ref 6–8.4)
PROT UR QL STRIP: NEGATIVE
RBC # BLD AUTO: 4.24 M/UL (ref 4–5.4)
SODIUM SERPL-SCNC: 139 MMOL/L (ref 136–145)
SP GR UR STRIP: 1.02 (ref 1–1.03)
URN SPEC COLLECT METH UR: NORMAL
UROBILINOGEN UR STRIP-ACNC: NEGATIVE EU/DL
WBC # BLD AUTO: 11.59 K/UL (ref 3.9–12.7)

## 2022-07-16 PROCEDURE — 25000003 PHARM REV CODE 250: Performed by: NURSE PRACTITIONER

## 2022-07-16 PROCEDURE — 81003 URINALYSIS AUTO W/O SCOPE: CPT | Performed by: NURSE PRACTITIONER

## 2022-07-16 PROCEDURE — 83690 ASSAY OF LIPASE: CPT | Performed by: NURSE PRACTITIONER

## 2022-07-16 PROCEDURE — 81025 URINE PREGNANCY TEST: CPT | Performed by: NURSE PRACTITIONER

## 2022-07-16 PROCEDURE — 99284 EMERGENCY DEPT VISIT MOD MDM: CPT | Mod: 25

## 2022-07-16 PROCEDURE — 96375 TX/PRO/DX INJ NEW DRUG ADDON: CPT

## 2022-07-16 PROCEDURE — 80053 COMPREHEN METABOLIC PANEL: CPT | Performed by: NURSE PRACTITIONER

## 2022-07-16 PROCEDURE — 96365 THER/PROPH/DIAG IV INF INIT: CPT

## 2022-07-16 PROCEDURE — 63600175 PHARM REV CODE 636 W HCPCS: Performed by: NURSE PRACTITIONER

## 2022-07-16 PROCEDURE — 85025 COMPLETE CBC W/AUTO DIFF WBC: CPT | Performed by: NURSE PRACTITIONER

## 2022-07-16 RX ORDER — MORPHINE SULFATE 2 MG/ML
2 INJECTION, SOLUTION INTRAMUSCULAR; INTRAVENOUS
Status: COMPLETED | OUTPATIENT
Start: 2022-07-16 | End: 2022-07-16

## 2022-07-16 RX ADMIN — MORPHINE SULFATE 2 MG: 2 INJECTION, SOLUTION INTRAMUSCULAR; INTRAVENOUS at 04:07

## 2022-07-16 RX ADMIN — PROMETHAZINE HYDROCHLORIDE 6.25 MG: 25 INJECTION INTRAMUSCULAR; INTRAVENOUS at 04:07

## 2022-07-16 NOTE — ED PROVIDER NOTES
"Encounter Date: 7/16/2022       History     Chief Complaint   Patient presents with    Abdominal Pain     Pt c/o right sided abd pain that radiates to the back. Pt was admitted to the hospital last week but was not able to get the upper Gi scope due to pain & fever. Dr. Lang told the pt to come to the ER if the pain worsened.      31 year old female with complaints of lower abdominal pain x 3 days. Associated diarrhea, nausea and vomiting. Patient has long history of abdominal problems. Patient was scheduled for EGD last week to evaluate a small bowel problem but was unable  Because patient had developed a cystitis. She is scheduled to see her GI doctor later this week.         Review of patient's allergies indicates:   Allergen Reactions    Halothane Other (See Comments)     Malignant Hyperthermia    Mucinex [guaifenesin] Other (See Comments)     Asthma attack     Succinylcholine Other (See Comments)     Malignant Hyperthermia    Amoxil [amoxicillin] Other (See Comments)     Makes my stomach hurt    Bactrim [sulfamethoxazole-trimethoprim] Nausea And Vomiting    Compazine [prochlorperazine edisylate]     Doxycycline     Neurontin [gabapentin] Other (See Comments)     Patient reports if makes her whole body numb    Pseudoephedrine     Succinylcholine chloride      Other reaction(s): Unknown    Toradol [ketorolac]      MIGRAINES    Tramadol      MIGRAINES    Trintellix [vortioxetine]      "PASS OUT AND CHEST PAIN"    Zofran [ondansetron hcl (pf)] Other (See Comments)     States: "It gives me migraines"       Brompheniramine-pseudoeph-dm Rash     Other reaction(s): Unknown  Other reaction(s): Unknown    Capzasin [capsaicin] Rash    Levaquin [levofloxacin] Rash    Trimethadione/paramethadione Rash     Past Medical History:   Diagnosis Date    Anemia     Anxiety     Arthritis     states to lower back from falling in 2008    Asthma     Blood in stool 06/2022    Carpal tunnel syndrome     " Carpal tunnel syndrome on right     EMERITA (cerebral atherosclerosis)     Demyelinating disease of central nervous system     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    Fibrosis, breast     Gallstones     Gallstones     GERD (gastroesophageal reflux disease)     History of chest pain     IBS (irritable bowel syndrome)     Insomnia     Kidney stones     Kidney stones     Malignant hyperthermia     age2    Migraine headache     N&V (nausea and vomiting) 06/2022    Osteoporosis     PFO (patent foramen ovale)     PFO (patent foramen ovale)     not flowing correctly (size 5)    Small intestine disorder     twisted    Thyroid disease      Past Surgical History:   Procedure Laterality Date    ADENOIDECTOMY      ADENOIDECTOMY      ARTHROSCOPY OF KNEE Left 8/7/2020    Procedure: ARTHROSCOPY, KNEE;  Surgeon: Mal Ayala MD;  Location: [facility], 1928–1983;  Service: Orthopedics;  Laterality: Left;    CARPAL TUNNEL RELEASE Right 10/29/2019    Procedure: RELEASE, CARPAL TUNNEL;  Surgeon: Mal Ayala MD;  Location: [facility], [facility]–1983;  Service: Orthopedics;  Laterality: Right;    CHOLECYSTECTOMY      ESOPHAGOGASTRODUODENOSCOPY N/A 12/5/2019    Procedure: EGD (ESOPHAGOGASTRODUODENOSCOPY);  Surgeon: Claudio Enriquez MD;  Location: [facility];  Service: Endoscopy;  Laterality: N/A;    injections to neck      LITHOTRIPSY      TONSILLECTOMY      TYMPANOSTOMY TUBE PLACEMENT       Family History   Problem Relation Age of Onset    Thyroid disease Mother     Carpal tunnel syndrome Mother     Cholecystitis Father     Kidney disease Father     Gout Father     No Known Problems Paternal Grandmother     No Known Problems Paternal Grandfather     No Known Problems Maternal Grandmother     Brain cancer Maternal Grandfather     Brain cancer Paternal Aunt     No Known Problems Sister      Social History     Tobacco Use    Smoking status: Never Smoker    Smokeless tobacco: Never Used   Substance Use Topics     Alcohol use: No    Drug use: No     Review of Systems   Constitutional: Negative for fever.   HENT: Negative for sore throat.    Respiratory: Negative for shortness of breath.    Cardiovascular: Negative for chest pain.   Gastrointestinal: Positive for abdominal pain, diarrhea, nausea and vomiting.   Genitourinary: Negative for dysuria.   Musculoskeletal: Negative for back pain.   Skin: Negative for rash.   Neurological: Negative for weakness.   Hematological: Does not bruise/bleed easily.       Physical Exam     Initial Vitals [07/16/22 1530]   BP Pulse Resp Temp SpO2   105/73 (!) 112 18 98.2 °F (36.8 °C) 98 %      MAP       --         Physical Exam    Nursing note and vitals reviewed.  Constitutional: Vital signs are normal. She appears well-developed and well-nourished. She is cooperative.   HENT:   Head: Normocephalic and atraumatic.   Right Ear: Hearing and external ear normal.   Left Ear: Hearing and external ear normal.   Nose: Nose normal.   Mouth/Throat: Uvula is midline, oropharynx is clear and moist and mucous membranes are normal.   Eyes: Conjunctivae, EOM and lids are normal. Pupils are equal, round, and reactive to light.   Neck: Trachea normal. Neck supple.   Normal range of motion.   Full passive range of motion without pain.     Cardiovascular: Normal rate, regular rhythm, S1 normal, S2 normal, normal heart sounds and normal pulses.   Pulmonary/Chest: Effort normal and breath sounds normal.   Abdominal: Abdomen is soft and flat. Bowel sounds are normal. There is abdominal tenderness in the right lower quadrant and left lower quadrant. No hernia. There is no rebound and no guarding.   Musculoskeletal:      Cervical back: Full passive range of motion without pain, normal range of motion and neck supple.     Neurological: She is alert.         ED Course   Procedures  Labs Reviewed   CBC W/ AUTO DIFFERENTIAL - Abnormal; Notable for the following components:       Result Value    Immature  Granulocytes 2.1 (*)     Gran # (ANC) 8.0 (*)     Immature Grans (Abs) 0.24 (*)     All other components within normal limits   COMPREHENSIVE METABOLIC PANEL - Abnormal; Notable for the following components:    Calcium 8.6 (*)     Anion Gap 6 (*)     All other components within normal limits   LIPASE   URINALYSIS, REFLEX TO URINE CULTURE    Narrative:     Preferred Collection Type->Urine, Clean Catch  Specimen Source->Urine   PREGNANCY TEST, URINE RAPID    Narrative:     Specimen Source->Urine          Imaging Results          X-Ray Abdomen Flat And Erect (In process)               X-Rays:   Independently Interpreted Readings:   Other Readings:  Non specific air pattern     Medications   promethazine (PHENERGAN) 6.25 mg in dextrose 5 % 50 mL IVPB (0 mg Intravenous Stopped 7/16/22 1639)   morphine injection 2 mg (2 mg Intravenous Given 7/16/22 1619)     Medical Decision Making:   Differential Diagnosis:   Abdominal pain,   ED Management:  After history and physical exam  X-ray shows some non specific air pattern in bowels. Will DC home and she will contact her GI doctor.                       Clinical Impression:   Final diagnoses:  [R11.2] Nausea and vomiting  [R10.31] Right lower quadrant abdominal pain (Primary)                 Kodak Costa III, NP  07/16/22 2669

## 2022-07-20 ENCOUNTER — TELEPHONE (OUTPATIENT)
Dept: SURGERY | Facility: CLINIC | Age: 32
End: 2022-07-20
Payer: MEDICAID

## 2022-07-21 ENCOUNTER — OFFICE VISIT (OUTPATIENT)
Dept: SURGERY | Facility: CLINIC | Age: 32
End: 2022-07-21
Payer: MEDICAID

## 2022-07-21 VITALS
DIASTOLIC BLOOD PRESSURE: 76 MMHG | OXYGEN SATURATION: 99 % | HEIGHT: 64 IN | BODY MASS INDEX: 20.16 KG/M2 | HEART RATE: 94 BPM | WEIGHT: 118.06 LBS | SYSTOLIC BLOOD PRESSURE: 128 MMHG

## 2022-07-21 DIAGNOSIS — K91.5 POST-CHOLECYSTECTOMY SYNDROME: Primary | ICD-10-CM

## 2022-07-21 DIAGNOSIS — K21.9 GASTROESOPHAGEAL REFLUX DISEASE, UNSPECIFIED WHETHER ESOPHAGITIS PRESENT: ICD-10-CM

## 2022-07-21 DIAGNOSIS — R10.13 EPIGASTRIC PAIN: ICD-10-CM

## 2022-07-21 PROCEDURE — 3078F PR MOST RECENT DIASTOLIC BLOOD PRESSURE < 80 MM HG: ICD-10-PCS | Mod: CPTII,,, | Performed by: STUDENT IN AN ORGANIZED HEALTH CARE EDUCATION/TRAINING PROGRAM

## 2022-07-21 PROCEDURE — 99999 PR PBB SHADOW E&M-EST. PATIENT-LVL IV: CPT | Mod: PBBFAC,,, | Performed by: STUDENT IN AN ORGANIZED HEALTH CARE EDUCATION/TRAINING PROGRAM

## 2022-07-21 PROCEDURE — 3074F PR MOST RECENT SYSTOLIC BLOOD PRESSURE < 130 MM HG: ICD-10-PCS | Mod: CPTII,,, | Performed by: STUDENT IN AN ORGANIZED HEALTH CARE EDUCATION/TRAINING PROGRAM

## 2022-07-21 PROCEDURE — 99213 PR OFFICE/OUTPT VISIT, EST, LEVL III, 20-29 MIN: ICD-10-PCS | Mod: S$PBB,,, | Performed by: STUDENT IN AN ORGANIZED HEALTH CARE EDUCATION/TRAINING PROGRAM

## 2022-07-21 PROCEDURE — 3008F BODY MASS INDEX DOCD: CPT | Mod: CPTII,,, | Performed by: STUDENT IN AN ORGANIZED HEALTH CARE EDUCATION/TRAINING PROGRAM

## 2022-07-21 PROCEDURE — 1159F MED LIST DOCD IN RCRD: CPT | Mod: CPTII,,, | Performed by: STUDENT IN AN ORGANIZED HEALTH CARE EDUCATION/TRAINING PROGRAM

## 2022-07-21 PROCEDURE — 99213 OFFICE O/P EST LOW 20 MIN: CPT | Mod: S$PBB,,, | Performed by: STUDENT IN AN ORGANIZED HEALTH CARE EDUCATION/TRAINING PROGRAM

## 2022-07-21 PROCEDURE — 3074F SYST BP LT 130 MM HG: CPT | Mod: CPTII,,, | Performed by: STUDENT IN AN ORGANIZED HEALTH CARE EDUCATION/TRAINING PROGRAM

## 2022-07-21 PROCEDURE — 99214 OFFICE O/P EST MOD 30 MIN: CPT | Mod: PBBFAC | Performed by: STUDENT IN AN ORGANIZED HEALTH CARE EDUCATION/TRAINING PROGRAM

## 2022-07-21 PROCEDURE — 3078F DIAST BP <80 MM HG: CPT | Mod: CPTII,,, | Performed by: STUDENT IN AN ORGANIZED HEALTH CARE EDUCATION/TRAINING PROGRAM

## 2022-07-21 PROCEDURE — 1159F PR MEDICATION LIST DOCUMENTED IN MEDICAL RECORD: ICD-10-PCS | Mod: CPTII,,, | Performed by: STUDENT IN AN ORGANIZED HEALTH CARE EDUCATION/TRAINING PROGRAM

## 2022-07-21 PROCEDURE — 99999 PR PBB SHADOW E&M-EST. PATIENT-LVL IV: ICD-10-PCS | Mod: PBBFAC,,, | Performed by: STUDENT IN AN ORGANIZED HEALTH CARE EDUCATION/TRAINING PROGRAM

## 2022-07-21 PROCEDURE — 3008F PR BODY MASS INDEX (BMI) DOCUMENTED: ICD-10-PCS | Mod: CPTII,,, | Performed by: STUDENT IN AN ORGANIZED HEALTH CARE EDUCATION/TRAINING PROGRAM

## 2022-07-22 ENCOUNTER — PATIENT MESSAGE (OUTPATIENT)
Dept: SURGERY | Facility: CLINIC | Age: 32
End: 2022-07-22
Payer: MEDICAID

## 2022-07-24 NOTE — HOSPITAL COURSE
Patient admitted from day surgery due to fever and vomiting.  Fever workup detected E.coli UTI and patient treated with IV abx.  On hospital day 2, patient tolerating soft diet without nausea or vomiting.  Pt afebrile for over 24hrs and was deemed eligible for discharge.

## 2022-07-24 NOTE — DISCHARGE SUMMARY
[facility]  General Surgery  Discharge Summary      Patient Name: Seema Myrick  MRN: [id number]  Admission Date: 7/6/2022  Hospital Length of Stay: 0 days  Discharge Date and Time: 7/8/2022 12:03 PM  Attending Physician: No att. providers found   Discharging Provider: Zelda Lang MD  Primary Care Provider: Stacy Hoover DO    HPI:   32yo female with complex medical history who presented to day surgery for EGD due to refractory abdominal pain, nausea, and vomiting.  In day surgery, Tmax 102 and tachycardic.  Procedure cancelled and patient admitted to [facility] for workup.        Procedure(s) (LRB):  EGD (ESOPHAGOGASTRODUODENOSCOPY) (N/A)      Indwelling Lines/Drains at time of discharge:   Lines/Drains/Airways     None               Hospital Course: Patient admitted from day surgery due to fever and vomiting.  Fever workup detected E.coli UTI and patient treated with IV abx.  On hospital day 2, patient tolerating soft diet without nausea or vomiting.  Pt afebrile for over 24hrs and was deemed eligible for discharge.       Goals of Care Treatment Preferences:  Code Status: Full Code      Consults:     Significant Diagnostic Studies: See above    Pending Diagnostic Studies:     None        Final Active Diagnoses:    Diagnosis Date Noted POA    PRINCIPAL PROBLEM:  GERD (gastroesophageal reflux disease) [K21.9] 01/29/2018 Yes    Drug-induced constipation [K59.03] 07/08/2022 Yes    Urinary tract infection without hematuria [N39.0] 07/07/2022 Yes    Anxiety [F41.9] 11/07/2016 Yes    Dental caries [K02.9] 10/06/2016 Yes      Problems Resolved During this Admission:      Discharged Condition: good    Disposition: Home or Self Care    Follow Up:    Patient Instructions:      Diet Adult Regular     Notify your health care provider if you experience any of the following:  temperature >100.4     Notify your health care provider if you experience any of the following:  persistent nausea and vomiting  or diarrhea     Notify your health care provider if you experience any of the following:  severe uncontrolled pain     Notify your health care provider if you experience any of the following:  redness, tenderness, or signs of infection (pain, swelling, redness, odor or green/yellow discharge around incision site)     Activity as tolerated     Medications:  Reconciled Home Medications:      Medication List      START taking these medications    dicyclomine 10 MG capsule  Commonly known as: BENTYL  Take 1 capsule (10 mg total) by mouth 4 (four) times daily.     polyethylene glycol 17 gram Pwpk  Commonly known as: GLYCOLAX  Take 17 g by mouth once daily.     potassium phosphate (monobasic) 500 mg Tbso  Commonly known as: K-PHOS  Take 1 tablet (500 mg total) by mouth once daily. for 7 days        CONTINUE taking these medications    * AJOVY AUTOINJECTOR 225 mg/1.5 mL autoinjector  Generic drug: fremanezumab-vfrm  INJECT 1 PEN INJECTOR UNDER THE SKIN ONCE A MONTH     * AJOVY AUTOINJECTOR 225 mg/1.5 mL autoinjector  Generic drug: fremanezumab-vfrm  Inject 225 mg into the skin every 28 days.     albuterol 90 mcg/actuation inhaler  Commonly known as: PROVENTIL/VENTOLIN HFA  INHALE 2 PUFFS BY MOUTH EVERY 6 HOURS AS NEEDED FOR WHEEZING OR SHORTNESS OF BREATH     BABY ASPIRIN ORAL  Take by mouth.     brivaracetam 50 mg Tab  Commonly known as: BRIVIACT  Take 50 mg by mouth 2 (two) times daily.     colestipoL 1 gram Tab  Commonly known as: COLESTID  Take 1 tablet (1 g total) by mouth 2 (two) times daily.     famotidine 20 MG tablet  Commonly known as: PEPCID  Take 1 tablet (20 mg total) by mouth 2 (two) times daily as needed for Heartburn.     LIDOcaine HCl 2% 2 % Soln  Commonly known as: LIDOcaine VISCOUS  by Mucous Membrane route every 3 (three) hours as needed (dental pain). Swish and spit 15ml     LORazepam 0.5 MG tablet  Commonly known as: ATIVAN  Take 0.5 mg by mouth every 8 (eight) hours as needed for Anxiety.      meclizine 25 mg tablet  Commonly known as: ANTIVERT  TAKE 1 TABLET(25 MG) BY MOUTH THREE TIMES DAILY AS NEEDED FOR DIZZINESS     NURTEC 75 mg odt  Generic drug: rimegepant  Take 75 mg by mouth once as needed for Migraine. Place ODT tablet on the tongue; alternatively the ODT tablet may be placed under the tongue     OSCIMIN SL 0.125 mg Subl  Generic drug: hyoscyamine  TAKE 1 TABLET BY MOUTH EVERY 4 HOURS AS NEEDED FOR CRAMP LIKE PAIN     pantoprazole 40 MG tablet  Commonly known as: PROTONIX  Take 1 tablet (40 mg total) by mouth 2 (two) times daily.     promethazine 12.5 MG Tab  Commonly known as: PHENERGAN  Take 1 tablet (12.5 mg total) by mouth 4 (four) times daily.     sucralfate 1 gram tablet  Commonly known as: CARAFATE  Take 1 tablet (1 g total) by mouth 4 (four) times daily.     tamsulosin 0.4 mg Cap  Commonly known as: FLOMAX  TAKE 1 CAPSULE(0.4 MG) BY MOUTH EVERY DAY     tiZANidine 4 MG tablet  Commonly known as: ZANAFLEX  Take 4 mg by mouth every 6 (six) hours as needed (take on mon, wed, fri).     zolpidem 10 mg Tab  Commonly known as: AMBIEN  Take 5 mg by mouth nightly as needed.         * This list has 2 medication(s) that are the same as other medications prescribed for you. Read the directions carefully, and ask your doctor or other care provider to review them with you.            ASK your doctor about these medications    acetaminophen-codeine 300-30mg 300-30 mg Tab  Commonly known as: TYLENOL #3  Take 1 tablet by mouth every 6 (six) hours as needed (dental pain).  Ask about: Should I take this medication?     clindamycin 150 MG capsule  Commonly known as: CLEOCIN  Take 2 capsules (300 mg total) by mouth 4 (four) times daily. for 5 days  Ask about: Should I take this medication?     HYDROcodone-acetaminophen 5-325 mg per tablet  Commonly known as: NORCO  Take 1 tablet by mouth every 6 (six) hours as needed for Pain.  Ask about: Should I take this medication?     nitrofurantoin  (macrocrystal-monohydrate) 100 MG capsule  Commonly known as: MACROBID  Take 1 capsule (100 mg total) by mouth every 12 (twelve) hours. for 7 days  Ask about: Should I take this medication?          Time spent on the discharge of patient: 10 minutes    Zelda Lang MD  General Surgery  [facility]

## 2022-07-25 ENCOUNTER — PATIENT MESSAGE (OUTPATIENT)
Dept: ADMINISTRATIVE | Facility: HOSPITAL | Age: 32
End: 2022-07-25
Payer: MEDICAID

## 2022-07-26 RX ORDER — OMEPRAZOLE 20 MG/1
20 CAPSULE, DELAYED RELEASE ORAL 2 TIMES DAILY
Qty: 60 CAPSULE | Refills: 11 | Status: SHIPPED | OUTPATIENT
Start: 2022-07-26 | End: 2022-08-12 | Stop reason: ALTCHOICE

## 2022-07-26 RX ORDER — MONTELUKAST SODIUM 4 MG/1
1 TABLET, CHEWABLE ORAL 2 TIMES DAILY
Qty: 60 TABLET | Refills: 2 | Status: SHIPPED | OUTPATIENT
Start: 2022-07-26 | End: 2024-02-09

## 2022-08-02 NOTE — PROGRESS NOTES
[facility]  General Surgery Clinic Progress Note    HPI:  Seema Myrick is a 31 y.o. female with history of refractory abdominal pain, nausea and vomiting who presents for follow up.  Symptoms unchanged since discharge.  Reports cessation from marijuana two months prior.  Says marijuana would help nausea, but she would feel worse after.  Has appointment with GI next month.      ROS:  Negative except above    PE:  Vitals:    07/21/22 1308   BP: 128/76   Pulse: 94       Gen: Alert and oriented x3, judgement intact, NAD  CV: RRR  Resp: CTAB; breaths non-labored and symmetrical  Abd: Soft, NT, ND, BS+  Extremities: No c/c/e    A/P:  31 y.o. female with refractory nausea and vomiting who presents for follow up  -Pt advised to continue abstinence from marijuana  -Continue colestipol and omeprazole BID   -Pt moving to [place] and will see GI close to [facility] PRN     Zelda Lang MD  General Surgery   [phone]        8/2/2022  9:04 AM

## 2022-08-12 ENCOUNTER — OFFICE VISIT (OUTPATIENT)
Dept: PRIMARY CARE CLINIC | Facility: CLINIC | Age: 32
End: 2022-08-12
Payer: MEDICAID

## 2022-08-12 VITALS
TEMPERATURE: 99 F | RESPIRATION RATE: 12 BRPM | BODY MASS INDEX: 20.14 KG/M2 | SYSTOLIC BLOOD PRESSURE: 102 MMHG | WEIGHT: 118 LBS | HEIGHT: 64 IN | HEART RATE: 98 BPM | DIASTOLIC BLOOD PRESSURE: 74 MMHG | OXYGEN SATURATION: 99 %

## 2022-08-12 DIAGNOSIS — Z87.442 HISTORY OF KIDNEY STONES: ICD-10-CM

## 2022-08-12 DIAGNOSIS — R11.2 REFRACTORY NAUSEA AND VOMITING: ICD-10-CM

## 2022-08-12 DIAGNOSIS — N28.1 RENAL CYST, LEFT: ICD-10-CM

## 2022-08-12 DIAGNOSIS — G43.009 MIGRAINE WITHOUT AURA AND WITHOUT STATUS MIGRAINOSUS, NOT INTRACTABLE: Primary | ICD-10-CM

## 2022-08-12 DIAGNOSIS — N28.1 RENAL CYST, LEFT: Primary | ICD-10-CM

## 2022-08-12 DIAGNOSIS — M47.812 CERVICAL FACET SYNDROME: ICD-10-CM

## 2022-08-12 DIAGNOSIS — N20.0 RENAL STONES: ICD-10-CM

## 2022-08-12 PROCEDURE — 3078F DIAST BP <80 MM HG: CPT | Mod: CPTII,,, | Performed by: STUDENT IN AN ORGANIZED HEALTH CARE EDUCATION/TRAINING PROGRAM

## 2022-08-12 PROCEDURE — 3074F SYST BP LT 130 MM HG: CPT | Mod: CPTII,,, | Performed by: STUDENT IN AN ORGANIZED HEALTH CARE EDUCATION/TRAINING PROGRAM

## 2022-08-12 PROCEDURE — 99214 PR OFFICE/OUTPT VISIT, EST, LEVL IV, 30-39 MIN: ICD-10-PCS | Mod: S$PBB,,, | Performed by: STUDENT IN AN ORGANIZED HEALTH CARE EDUCATION/TRAINING PROGRAM

## 2022-08-12 PROCEDURE — 3074F PR MOST RECENT SYSTOLIC BLOOD PRESSURE < 130 MM HG: ICD-10-PCS | Mod: CPTII,,, | Performed by: STUDENT IN AN ORGANIZED HEALTH CARE EDUCATION/TRAINING PROGRAM

## 2022-08-12 PROCEDURE — 3008F BODY MASS INDEX DOCD: CPT | Mod: CPTII,,, | Performed by: STUDENT IN AN ORGANIZED HEALTH CARE EDUCATION/TRAINING PROGRAM

## 2022-08-12 PROCEDURE — 3008F PR BODY MASS INDEX (BMI) DOCUMENTED: ICD-10-PCS | Mod: CPTII,,, | Performed by: STUDENT IN AN ORGANIZED HEALTH CARE EDUCATION/TRAINING PROGRAM

## 2022-08-12 PROCEDURE — 3078F PR MOST RECENT DIASTOLIC BLOOD PRESSURE < 80 MM HG: ICD-10-PCS | Mod: CPTII,,, | Performed by: STUDENT IN AN ORGANIZED HEALTH CARE EDUCATION/TRAINING PROGRAM

## 2022-08-12 PROCEDURE — 99999 PR PBB SHADOW E&M-EST. PATIENT-LVL V: CPT | Mod: PBBFAC,,, | Performed by: STUDENT IN AN ORGANIZED HEALTH CARE EDUCATION/TRAINING PROGRAM

## 2022-08-12 PROCEDURE — 99999 PR PBB SHADOW E&M-EST. PATIENT-LVL V: ICD-10-PCS | Mod: PBBFAC,,, | Performed by: STUDENT IN AN ORGANIZED HEALTH CARE EDUCATION/TRAINING PROGRAM

## 2022-08-12 PROCEDURE — 99214 OFFICE O/P EST MOD 30 MIN: CPT | Mod: S$PBB,,, | Performed by: STUDENT IN AN ORGANIZED HEALTH CARE EDUCATION/TRAINING PROGRAM

## 2022-08-12 PROCEDURE — 99215 OFFICE O/P EST HI 40 MIN: CPT | Mod: PBBFAC,PN | Performed by: STUDENT IN AN ORGANIZED HEALTH CARE EDUCATION/TRAINING PROGRAM

## 2022-08-12 RX ORDER — ACETAMINOPHEN AND CODEINE PHOSPHATE 300; 30 MG/1; MG/1
1 TABLET ORAL EVERY 12 HOURS PRN
Qty: 10 TABLET | Refills: 0 | Status: SHIPPED | OUTPATIENT
Start: 2022-08-12 | End: 2022-08-17

## 2022-08-12 RX ORDER — PANTOPRAZOLE SODIUM 40 MG/1
40 TABLET, DELAYED RELEASE ORAL DAILY
Qty: 30 TABLET | Refills: 11 | Status: CANCELLED | OUTPATIENT
Start: 2022-08-12 | End: 2023-08-12

## 2022-08-12 RX ORDER — OMEPRAZOLE 40 MG/1
40 CAPSULE, DELAYED RELEASE ORAL DAILY
Qty: 30 CAPSULE | Refills: 2 | Status: SHIPPED | OUTPATIENT
Start: 2022-08-12 | End: 2024-03-18

## 2022-08-12 NOTE — ASSESSMENT & PLAN NOTE
Tylenol and ibuprofen have not helped reduce her migraine headaches. Has follow up with neurology on 8/16/22. Patient under stress with minimal sleep at bedtime secondary to cervicalgia triggered migraine. She states she had some relief with taking her last Tylenol 3 from General Surgery with 12 hours of rest. Counseled on maintaining adequate hydration even with low appetite at this time. Will provide rx for pain until follow up with neurology.

## 2022-08-12 NOTE — ASSESSMENT & PLAN NOTE
Most recent scan showing multiple <4-5mm stones in left kidney. Patient has had stone analyzed in the past as calcium oxalate stones. Follow up referral urology.

## 2022-08-12 NOTE — ASSESSMENT & PLAN NOTE
Under care with neurology, s/p dexa scan. Currently causing much distress and pain over cervical region, not relieved by heating pad, lidocaine cream, Tylenol and ibuprofen.   - start Tylenol 3 BID PRN for pain for 5 days  - f/u one week or sooner with worsening pain

## 2022-08-12 NOTE — PROGRESS NOTES
[facility] Note    Chief Complaint      Chief Complaint   Patient presents with    Abdominal Pain     Patient wanting a referral for her kidney.        History of Present Illness      Seema Myrick is a 32 y.o. female who presents today for Abdominal Pain (Patient wanting a referral for her kidney. )    Past Medical History:  Past Medical History:   Diagnosis Date    Anemia     Anxiety     Arthritis     states to lower back from falling in 2008    Asthma     Blood in stool 06/2022    Carpal tunnel syndrome     Carpal tunnel syndrome on right     EMERITA (cerebral atherosclerosis)     Demyelinating disease of central nervous system     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    Fibrosis, breast     Gallstones     Gallstones     GERD (gastroesophageal reflux disease)     History of chest pain     IBS (irritable bowel syndrome)     Insomnia     Kidney stones     Kidney stones     Malignant hyperthermia     age1    Migraine headache     N&V (nausea and vomiting) 06/2022    Osteoporosis     PFO (patent foramen ovale)     PFO (patent foramen ovale)     not flowing correctly (size 5)    Small intestine disorder     twisted    Thyroid disease        Past Surgical History:   has a past surgical history that includes Tonsillectomy; Tympanostomy tube placement; Cholecystectomy; Adenoidectomy; Lithotripsy; Carpal tunnel release (Right, 10/29/2019); Esophagogastroduodenoscopy (N/A, 12/5/2019); Arthroscopy of knee (Left, 8/7/2020); injections to neck; and Adenoidectomy.    Family History:  family history includes Brain cancer in her maternal grandfather and paternal aunt; Carpal tunnel syndrome in her mother; Cholecystitis in her father; Gout in her father; Kidney disease in her father; No Known Problems in her maternal grandmother, paternal grandfather, paternal grandmother, and sister; Thyroid disease in her mother.     Social History:  Social History     Tobacco Use    Smoking status: Never  "Smoker    Smokeless tobacco: Never Used   Substance Use Topics    Alcohol use: No    Drug use: No     I personally reviewed all past medical, surgical, social and family history.    Review of Systems   Constitutional: Negative for chills, fever, malaise/fatigue and weight loss.   HENT: Negative for congestion, ear discharge, ear pain, sinus pain and sore throat.    Eyes: Negative for blurred vision, pain, discharge and redness.   Respiratory: Negative for cough, hemoptysis, sputum production, shortness of breath, wheezing and stridor.    Cardiovascular: Negative for chest pain, palpitations and leg swelling.   Gastrointestinal: Positive for abdominal pain (constant, baseline "spasms"). Negative for blood in stool, constipation, diarrhea, nausea and vomiting.        Anorexia   Genitourinary: Positive for flank pain (intermittent). Negative for dysuria, frequency and hematuria.   Musculoskeletal: Positive for neck pain (cervical ). Negative for back pain, falls, joint pain and myalgias.   Skin: Negative for rash.   Neurological: Negative for dizziness, tingling, focal weakness, seizures, weakness and headaches.   Endo/Heme/Allergies: Negative for environmental allergies and polydipsia. Does not bruise/bleed easily.   Psychiatric/Behavioral: Negative for depression and suicidal ideas. The patient has insomnia (secondary to pain over neck). The patient is not nervous/anxious.       Medications:  Outpatient Encounter Medications as of 8/12/2022   Medication Sig Dispense Refill    AJOVY AUTOINJECTOR 225 mg/1.5 mL autoinjector INJECT 1 PEN INJECTOR UNDER THE SKIN ONCE A MONTH      albuterol (PROVENTIL/VENTOLIN HFA) 90 mcg/actuation inhaler INHALE 2 PUFFS BY MOUTH EVERY 6 HOURS AS NEEDED FOR WHEEZING OR SHORTNESS OF BREATH 18 g 11    BABY ASPIRIN ORAL Take by mouth.      colestipoL (COLESTID) 1 gram Tab Take 1 tablet (1 g total) by mouth 2 (two) times daily. 60 tablet 2    LORazepam (ATIVAN) 0.5 MG tablet Take 0.5 mg by " mouth every 8 (eight) hours as needed for Anxiety.       meclizine (ANTIVERT) 25 mg tablet TAKE 1 TABLET(25 MG) BY MOUTH THREE TIMES DAILY AS NEEDED FOR DIZZINESS 90 tablet 5    OSCIMIN SL 0.125 mg Subl TAKE 1 TABLET BY MOUTH EVERY 4 HOURS AS NEEDED FOR CRAMP LIKE PAIN 120 tablet 1    promethazine (PHENERGAN) 12.5 MG Tab Take 1 tablet (12.5 mg total) by mouth 4 (four) times daily. 120 tablet 2    rimegepant (NURTEC) 75 mg odt Take 75 mg by mouth once as needed for Migraine. Place ODT tablet on the tongue; alternatively the ODT tablet may be placed under the tongue      tamsulosin (FLOMAX) 0.4 mg Cap TAKE 1 CAPSULE(0.4 MG) BY MOUTH EVERY DAY 90 capsule 3    tiZANidine (ZANAFLEX) 4 MG tablet Take 4 mg by mouth every 6 (six) hours as needed (take on mon, wed, fri).      [DISCONTINUED] famotidine (PEPCID) 20 MG tablet Take 1 tablet (20 mg total) by mouth 2 (two) times daily as needed for Heartburn. 60 tablet 5    [DISCONTINUED] omeprazole (PRILOSEC) 20 MG capsule Take 1 capsule (20 mg total) by mouth 2 (two) times a day. 60 capsule 11    [DISCONTINUED] pantoprazole (PROTONIX) 40 MG tablet Take 1 tablet (40 mg total) by mouth 2 (two) times daily. 180 tablet 3    acetaminophen-codeine 300-30mg (TYLENOL #3) 300-30 mg Tab Take 1 tablet by mouth every 12 (twelve) hours as needed. 10 tablet 0    brivaracetam (BRIVIACT) 50 mg Tab Take 50 mg by mouth 2 (two) times daily.      fremanezumab-vfrm (AJOVY AUTOINJECTOR) 225 mg/1.5 mL autoinjector Inject 225 mg into the skin every 28 days.      LIDOcaine HCl 2% (LIDOCAINE VISCOUS) 2 % Soln by Mucous Membrane route every 3 (three) hours as needed (dental pain). Swish and spit 15ml (Patient not taking: Reported on 8/12/2022) 120 mL 0    omeprazole (PRILOSEC) 40 MG capsule Take 1 capsule (40 mg total) by mouth once daily. 30 capsule 2    polyethylene glycol (GLYCOLAX) 17 gram PwPk Take 17 g by mouth once daily. (Patient not taking: Reported on 8/12/2022) 30 each 2    potassium phosphate,  "monobasic, (K-PHOS) 500 mg TbSO Take 1 tablet (500 mg total) by mouth once daily. for 7 days 7 tablet 0     No facility-administered encounter medications on file as of 8/12/2022.       Allergies:  Review of patient's allergies indicates:   Allergen Reactions    Halothane Other (See Comments)     Malignant Hyperthermia    Mucinex [guaifenesin] Other (See Comments)     Asthma attack     Succinylcholine Other (See Comments)     Malignant Hyperthermia    Amoxil [amoxicillin] Other (See Comments)     Makes my stomach hurt    Bactrim [sulfamethoxazole-trimethoprim] Nausea And Vomiting    Compazine [prochlorperazine edisylate]     Doxycycline     Neurontin [gabapentin] Other (See Comments)     Patient reports if makes her whole body numb    Pseudoephedrine     Succinylcholine chloride      Other reaction(s): Unknown    Toradol [ketorolac]      MIGRAINES    Tramadol      MIGRAINES    Trintellix [vortioxetine]      "PASS OUT AND CHEST PAIN"    Zofran [ondansetron hcl (pf)] Other (See Comments)     States: "It gives me migraines"       Brompheniramine-pseudoeph-dm Rash     Other reaction(s): Unknown  Other reaction(s): Unknown    Capzasin [capsaicin] Rash    Levaquin [levofloxacin] Rash    Trimethadione/paramethadione Rash       Health Maintenance:  Immunization History   Administered Date(s) Administered    DTaP 1989, 1989, 02/05/1990, 02/27/1991, 06/28/1994    HIB 02/27/1991    HPV Quadrivalent 02/13/2008, 04/17/2008, 08/25/2008    Hepatitis B 04/13/2000, 05/15/2000, 10/10/2000    IPV 1989, 1989, 02/27/1991, 06/28/1994    Influenza 01/29/2008    MMR 02/27/1991, 06/28/1994    Meningococcal Conjugate (MCV4P) 04/07/2006    Td (ADULT) 08/15/2003, 10/20/2004    Tdap 09/27/2017      Health Maintenance   Topic Date Due    TETANUS VACCINE  09/27/2027    Hepatitis C Screening  Completed    Lipid Panel  Completed        Physical Exam      Vital Signs  Temp: 98.5 °F (36.9 °C)  Temp src: Oral  Pulse: 98  Resp: " "12  SpO2: 99 %  BP: 102/74  BP Location: Right arm  Patient Position: Sitting  Pain Score: 10-Worst pain ever  Pain Loc: Head  Height and Weight  Height: 5' 4" (162.6 cm)  Weight: 53.5 kg (118 lb)  BSA (Calculated - sq m): 1.55 sq meters  BMI (Calculated): 20.2  Weight in (lb) to have BMI = 25: 145.3]    Physical Exam  Vitals reviewed.   Constitutional:       General: She is not in acute distress.     Appearance: Normal appearance. She is normal weight. She is ill-appearing. She is not toxic-appearing or diaphoretic.   HENT:      Head: Normocephalic and atraumatic.      Right Ear: External ear normal.      Left Ear: External ear normal.      Nose: Nose normal.      Mouth/Throat:      Mouth: Mucous membranes are moist.      Pharynx: Oropharynx is clear.   Eyes:      Extraocular Movements: Extraocular movements intact.      Conjunctiva/sclera: Conjunctivae normal.   Cardiovascular:      Rate and Rhythm: Normal rate and regular rhythm.      Pulses: Normal pulses.      Heart sounds: Normal heart sounds.   Pulmonary:      Effort: Pulmonary effort is normal. No respiratory distress.      Breath sounds: No stridor. No wheezing, rhonchi or rales.   Abdominal:      General: Abdomen is flat. Bowel sounds are normal. There is no distension.      Palpations: Abdomen is soft. There is no mass.      Tenderness: There is no abdominal tenderness. There is no right CVA tenderness, left CVA tenderness or guarding.   Musculoskeletal:         General: Normal range of motion.      Cervical back: Normal range of motion and neck supple. Tenderness (cervical paraspinal and over spinous processes) present. No rigidity.      Right lower leg: No edema.      Left lower leg: No edema.   Skin:     General: Skin is warm and dry.      Capillary Refill: Capillary refill takes less than 2 seconds.   Neurological:      General: No focal deficit present.      Mental Status: She is alert and oriented to person, place, and time. Mental status is at " baseline.      Motor: No weakness.      Gait: Gait normal.   Psychiatric:         Mood and Affect: Mood normal.         Behavior: Behavior normal.         Thought Content: Thought content normal.         Judgment: Judgment normal.        Laboratory:  CBC:  Recent Labs   Lab 07/06/22  1014 07/07/22  0928 07/16/22  1605   WBC 10.91 7.73 11.59   RBC 3.75 L 3.27 L 4.24   Hemoglobin 11.5 L 10.1 L 12.9   Hematocrit 34.8 L 30.1 L 39.0   Platelets 167 144 L 411   MCV 93 92 92   MCH 30.7 30.9 30.4   MCHC 33.0 33.6 33.1     CMP:  Recent Labs   Lab 07/06/22  1013 07/07/22  0928 07/16/22  1605   Glucose 104 102 90   Calcium 8.5 L 7.6 L 8.6 L   Albumin 3.0 L 2.2 L 3.6   Total Protein 6.9 5.4 L 7.7   Sodium 137 141 139   Potassium 3.6 3.3 L 4.0   CO2 27 23 25   Chloride 108 113 H 108   BUN 7 3 L 18   Alkaline Phosphatase 69 49 L 67   ALT 14 10 20   AST 16 12 17   Total Bilirubin 0.3 0.3 0.3     URINALYSIS:  Recent Labs   Lab 05/04/21  1233 10/06/21  2016 04/28/22  1800 06/17/22  1909 07/06/22  2030 07/16/22  1604   Color, UA Yellow  Yellow Yellow   < > Yellow Yellow Yellow   Clarity, UA Clear  --   --   --   --   --    Specific Gravity, UA 1.030 >1.030   < > 1.010 <=1.005 A 1.020   Spec Grav UA >=1.030  --   --   --   --   --    pH, UA 5.0  5.0 5.5   < > 5.0 6.0 5.0   Protein, UA Negative 1+ A   < > 1+ A Negative Negative   Bacteria Rare None  --  Many A Few A  --    Nitrite, UA Negative  Negative Negative   < > Positive A Negative Negative   Leukocytes, UA Negative 1+ A   < > 2+ A 2+ A Negative   Urobilinogen, UA 0.2.E.U./dL 2.0-3.0 A   < > 1.0 Negative Negative   Hyaline Casts, UA  --  0  --  2.3 A 0.8 A  --     < > = values in this interval not displayed.      LIPIDS:  Recent Labs   Lab 06/01/20  0908 07/20/20  0954 10/06/21  2007 05/19/22  1621   TSH 0.451 0.991 0.27 L  --    HDL  --   --   --  70   Cholesterol  --   --   --  185   Triglycerides  --   --   --  60   LDL Cholesterol  --   --   --  103.0   HDL/Cholesterol  Ratio  --   --   --  37.8   Non-HDL Cholesterol  --   --   --  115   Total Cholesterol/HDL Ratio  --   --   --  2.6     TSH:  Recent Labs   Lab 06/01/20  0908 07/20/20  0954 10/06/21  2007   TSH 0.451 0.991 0.27 L     A1C:  Recent Labs   Lab 06/01/20  0908 07/20/20  0954   Hemoglobin A1C 5.2 5.0     Eliseo Cornell MD  [phone] 7/8/2022 Routine     Narrative & Impression  EXAMINATION:  CT RENAL STONE STUDY ABD PELVIS WO     CLINICAL HISTORY:  Flank pain, kidney stone suspected;recurrent UTI;  RUQ and R flank pain;     TECHNIQUE:  Iterative reconstruction technique was used.     CT/cardiac nuclear exam/s in prior 12 months:  2.     COMPARISON:  CT abdomen and pelvis 10/06/2021.     FINDINGS:  Unremarkable noncontrast liver and spleen.  Prior cholecystectomy.  Unremarkable noncontrast pancreas and adrenal glands.  Mild right hydronephrosis and hydroureter.  Mild right perinephric edema, but no stones seen within the right ureter.  Findings may be related to recently passed stone or pyelonephritis.  Multiple intrarenal stones left kidney, measuring up to 4 mm at the midpole.  No bowel obstruction.  Small amount of nonspecific pelvic free fluid.  Pelvic viscera unremarkable.     Impression:     1. Mild right hydronephrosis and hydroureter without obstructing stone identified, could be related to recently passed stone or pyelonephritis.  2. Left nephrolithiasis.  3. Small amount of nonspecific pelvic free fluid.        Electronically signed by: Eliseo Cornell MD  Date:                                            07/08/2022  Time:                                           10:53      Assessment/Plan     Seema Myrick is a 32 y.o.female with:    Problem List Items Addressed This Visit          Neuro    Migraine without aura and without status migrainosus, not intractable    Current Assessment & Plan     Tylenol and ibuprofen have not helped reduce her migraine headaches. Has follow up with neurology on 8/16/22.  Patient under stress with minimal sleep at bedtime secondary to cervicalgia triggered migraine. She states she had some relief with taking her last Tylenol 3 from [facility] with 12 hours of rest. Counseled on maintaining adequate hydration even with low appetite at this time. Will provide rx for pain until follow up with neurology.            Relevant Medications    acetaminophen-codeine 300-30mg (TYLENOL #3) 300-30 mg Tab    Cervical facet syndrome    Current Assessment & Plan     Under care with neurology, s/p dexa scan. Currently causing much distress and pain over cervical region, not relieved by heating pad, lidocaine cream, Tylenol and ibuprofen.   - start Tylenol 3 BID PRN for pain for 5 days  - f/u one week or sooner with worsening pain              Renal/    History of kidney stones    Current Assessment & Plan     Most recent scan showing multiple <4-5mm stones in left kidney. Patient has had stone analyzed in the past as calcium oxalate stones. Follow up referral urology.            Renal cyst, left - Primary    Current Assessment & Plan     Findings on CT since 2015. Stable, present on most recent scan. Follow up referral to urology.            Relevant Orders    Ambulatory referral/consult to Urology       GI    Refractory nausea and vomiting    Relevant Medications    omeprazole (PRILOSEC) 40 MG capsule          Other than changes above, cont current medications and maintain follow up with specialists.  No follow-ups on file.    No future appointments.  Kazumi G Yoshinaga, DO [facility]

## 2022-08-19 ENCOUNTER — HOSPITAL ENCOUNTER (EMERGENCY)
Facility: HOSPITAL | Age: 32
Discharge: HOME | End: 2022-08-19
Attending: STUDENT IN AN ORGANIZED HEALTH CARE EDUCATION/TRAINING PROGRAM
Payer: MEDICAID

## 2022-08-19 VITALS
RESPIRATION RATE: 18 BRPM | BODY MASS INDEX: 20.32 KG/M2 | HEIGHT: 64 IN | WEIGHT: 119 LBS | SYSTOLIC BLOOD PRESSURE: 108 MMHG | OXYGEN SATURATION: 97 % | TEMPERATURE: 98 F | HEART RATE: 104 BPM | DIASTOLIC BLOOD PRESSURE: 74 MMHG

## 2022-08-19 DIAGNOSIS — G43.909 MIGRAINE WITHOUT STATUS MIGRAINOSUS, NOT INTRACTABLE, UNSPECIFIED MIGRAINE TYPE: Primary | ICD-10-CM

## 2022-08-19 PROCEDURE — 99284 EMERGENCY DEPT VISIT MOD MDM: CPT | Mod: 25

## 2022-08-19 PROCEDURE — 25000003 PHARM REV CODE 250: Performed by: NURSE PRACTITIONER

## 2022-08-19 PROCEDURE — 63600175 PHARM REV CODE 636 W HCPCS: Performed by: NURSE PRACTITIONER

## 2022-08-19 PROCEDURE — 96372 THER/PROPH/DIAG INJ SC/IM: CPT | Performed by: NURSE PRACTITIONER

## 2022-08-19 RX ORDER — BUTALBITAL, ACETAMINOPHEN AND CAFFEINE 50; 325; 40 MG/1; MG/1; MG/1
1 TABLET ORAL
Status: COMPLETED | OUTPATIENT
Start: 2022-08-19 | End: 2022-08-19

## 2022-08-19 RX ORDER — PROMETHAZINE HYDROCHLORIDE 25 MG/ML
25 INJECTION, SOLUTION INTRAMUSCULAR; INTRAVENOUS
Status: COMPLETED | OUTPATIENT
Start: 2022-08-19 | End: 2022-08-19

## 2022-08-19 RX ADMIN — BUTALBITAL, ACETAMINOPHEN, AND CAFFEINE 1 TABLET: 50; 325; 40 TABLET ORAL at 08:08

## 2022-08-19 RX ADMIN — PROMETHAZINE HYDROCHLORIDE 25 MG: 25 INJECTION INTRAMUSCULAR; INTRAVENOUS at 07:08

## 2022-08-19 RX ADMIN — BUTALBITAL, ACETAMINOPHEN, AND CAFFEINE 1 TABLET: 50; 325; 40 TABLET ORAL at 07:08

## 2022-08-19 NOTE — Clinical Note
"Seema Murillocece Myrick was seen and treated in our emergency department on 8/19/2022.  She may return to work on 08/20/2022.       If you have any questions or concerns, please don't hesitate to call.      Delia Mancilla NP"

## 2022-08-20 NOTE — ED PROVIDER NOTES
"Encounter Date: 8/19/2022       History     Chief Complaint   Patient presents with    Headache     Complains of migraine headache x 4 days. Sensitive to light and sound. Reports nausea. Out of migraine medication (nurtec) Otc meds not helping.      This is a 32-year-old white female with a history of migraine headaches who presents to the emergency department with complaints of migraine.  The patient reports that over the last 4 days she has been experiencing constant throbbing pain to the left side of the head associated with photosensitivity, nausea, and sensitivity to sound.  She states that she is out of her migraine medication, and no over-the-counter medications are helping her headache.  She reports pain is similar to previous migraine attacks and denies recent injury, URI signs and symptoms, muscle weakness, or any other relative symptoms at this time. Pt reports multiple allergies, including toradol, zofran, tramadol; states imitrex "doesn't work".         Review of patient's allergies indicates:   Allergen Reactions    Halothane Other (See Comments)     Malignant Hyperthermia    Mucinex [guaifenesin] Other (See Comments)     Asthma attack     Succinylcholine Other (See Comments)     Malignant Hyperthermia    Amoxil [amoxicillin] Other (See Comments)     Makes my stomach hurt    Bactrim [sulfamethoxazole-trimethoprim] Nausea And Vomiting    Compazine [prochlorperazine edisylate]     Doxycycline     Neurontin [gabapentin] Other (See Comments)     Patient reports if makes her whole body numb    Pseudoephedrine     Succinylcholine chloride      Other reaction(s): Unknown    Toradol [ketorolac]      MIGRAINES    Tramadol      MIGRAINES    Trintellix [vortioxetine]      "PASS OUT AND CHEST PAIN"    Zofran [ondansetron hcl (pf)] Other (See Comments)     States: "It gives me migraines"       Brompheniramine-pseudoeph-dm Rash     Other reaction(s): Unknown  Other reaction(s): Unknown    Capzasin " [capsaicin] Rash    Levaquin [levofloxacin] Rash    Trimethadione/paramethadione Rash     Past Medical History:   Diagnosis Date    Anemia     Anxiety     Arthritis     states to lower back from falling in 2008    Asthma     Blood in stool 06/2022    Carpal tunnel syndrome     Carpal tunnel syndrome on right     EMERITA (cerebral atherosclerosis)     Demyelinating disease of central nervous system     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    Fibrosis, breast     Gallstones     Gallstones     GERD (gastroesophageal reflux disease)     History of chest pain     IBS (irritable bowel syndrome)     Insomnia     Kidney stones     Kidney stones     Malignant hyperthermia     age2    Migraine headache     N&V (nausea and vomiting) 06/2022    Osteoporosis     PFO (patent foramen ovale)     PFO (patent foramen ovale)     not flowing correctly (size 5)    Small intestine disorder     twisted    Thyroid disease      Past Surgical History:   Procedure Laterality Date    ADENOIDECTOMY      ADENOIDECTOMY      ARTHROSCOPY OF KNEE Left 8/7/2020    Procedure: ARTHROSCOPY, KNEE;  Surgeon: Mal Ayala MD;  Location[facility];  Service: Orthopedics;  Laterality: Left;    CARPAL TUNNEL RELEASE Right 10/29/2019    Procedure: RELEASE, CARPAL TUNNEL;  Surgeon: Mal Ayala MD;  Location: [facility];  Service: Orthopedics;  Laterality: Right;    CHOLECYSTECTOMY      ESOPHAGOGASTRODUODENOSCOPY N/A 12/5/2019    Procedure: EGD (ESOPHAGOGASTRODUODENOSCOPY);  Surgeon: Claudio Enriquez MD;  Location: [facility];  Service: Endoscopy;  Laterality: N/A;    injections to neck      LITHOTRIPSY      TONSILLECTOMY      TYMPANOSTOMY TUBE PLACEMENT       Family History   Problem Relation Age of Onset    Thyroid disease Mother     Carpal tunnel syndrome Mother     Cholecystitis Father     Kidney disease Father     Gout Father     No Known Problems Paternal Grandmother     No Known Problems  Paternal Grandfather     No Known Problems Maternal Grandmother     Brain cancer Maternal Grandfather     Brain cancer Paternal Aunt     No Known Problems Sister      Social History     Tobacco Use    Smoking status: Never Smoker    Smokeless tobacco: Never Used   Substance Use Topics    Alcohol use: No    Drug use: No     Review of Systems   HENT: Negative.    Eyes: Positive for photophobia.   Respiratory: Negative.    Cardiovascular: Negative.    Gastrointestinal: Positive for nausea.   Neurological: Positive for headaches.       Physical Exam     Initial Vitals [08/19/22 1835]   BP Pulse Resp Temp SpO2   108/74 104 18 98.2 °F (36.8 °C) 97 %      MAP       --         Physical Exam    Nursing note and vitals reviewed.  Constitutional: She appears well-developed and well-nourished. She is active. No distress.   HENT:   Head: Normocephalic and atraumatic.   Eyes: EOM are normal. Pupils are equal, round, and reactive to light.   Neck: Neck supple.   Normal range of motion.  Cardiovascular: Normal rate, regular rhythm and normal heart sounds.   Pulmonary/Chest: Breath sounds normal. No respiratory distress.   Musculoskeletal:         General: Normal range of motion.      Cervical back: Normal range of motion and neck supple.     Neurological: She is alert and oriented to person, place, and time. She has normal strength. GCS score is 15. GCS eye subscore is 4. GCS verbal subscore is 5. GCS motor subscore is 6.   Skin: Skin is warm and dry. Capillary refill takes less than 2 seconds.   Psychiatric: She has a normal mood and affect. Her behavior is normal. Thought content normal.         ED Course   Procedures  Labs Reviewed - No data to display       Imaging Results    None          Medications   promethazine injection 25 mg (25 mg Intramuscular Given 8/19/22 1919)   butalbital-acetaminophen-caffeine -40 mg per tablet 1 tablet (1 tablet Oral Given 8/19/22 1918)   butalbital-acetaminophen-caffeine -40  mg per tablet 1 tablet (1 tablet Oral Given 8/19/22 2009)                 ED Course as of 08/19/22 2017   Fri Aug 19, 2022   2015 Pt cont with nad, aaox4, resting quietly and intermittently sleeping.  [CB]      ED Course User Index  [CB] Delia Mancilla NP             Clinical Impression:   Final diagnoses:  [G43.909] Migraine without status migrainosus, not intractable, unspecified migraine type (Primary)          ED Disposition Condition    Discharge Stable        ED Prescriptions     None        Follow-up Information     Follow up With Specialties Details Why Contact Info    Stacy Hoover,  Family Medicine Schedule an appointment as soon as possible for a visit in 3 days for re-evaluation of today's complaint [address]   [address]  [facility] [address] [phone]             Delia Mancilla NP  08/19/22 2017

## 2022-08-20 NOTE — DISCHARGE INSTRUCTIONS
Continue home medications. It is important that you follow up with your primary doctor for further management of your migraine headaches.

## 2022-08-22 ENCOUNTER — TELEPHONE (OUTPATIENT)
Dept: PRIMARY CARE CLINIC | Facility: CLINIC | Age: 32
End: 2022-08-22
Payer: MEDICAID

## 2022-08-22 NOTE — TELEPHONE ENCOUNTER
Patient needs migraine meds.  She called dr peralta but is unclear why they all of a sudden told her that they dropped her as a patient.  She states she recently went to ED for this migraine.

## 2022-09-29 ENCOUNTER — PATIENT MESSAGE (OUTPATIENT)
Dept: PRIMARY CARE CLINIC | Facility: CLINIC | Age: 32
End: 2022-09-29
Payer: MEDICAID

## 2022-10-04 ENCOUNTER — PATIENT MESSAGE (OUTPATIENT)
Dept: ADMINISTRATIVE | Facility: HOSPITAL | Age: 32
End: 2022-10-04
Payer: MEDICAID

## 2022-10-04 DIAGNOSIS — N20.0 RECURRENT NEPHROLITHIASIS: Primary | ICD-10-CM

## 2022-10-04 RX ORDER — TAMSULOSIN HYDROCHLORIDE 0.4 MG/1
0.4 CAPSULE ORAL DAILY
Qty: 30 CAPSULE | Refills: 2 | Status: SHIPPED | OUTPATIENT
Start: 2022-10-04 | End: 2023-07-26

## 2023-01-09 ENCOUNTER — PATIENT MESSAGE (OUTPATIENT)
Dept: ADMINISTRATIVE | Facility: HOSPITAL | Age: 33
End: 2023-01-09
Payer: MEDICAID

## 2023-01-17 ENCOUNTER — TELEPHONE (OUTPATIENT)
Dept: PRIMARY CARE CLINIC | Facility: CLINIC | Age: 33
End: 2023-01-17
Payer: MEDICAID

## 2023-08-11 PROBLEM — Z3A.36 36 WEEKS GESTATION OF PREGNANCY: Status: ACTIVE | Noted: 2023-08-11

## 2023-08-15 NOTE — DISCHARGE INSTRUCTIONS
Follow-up as scheduled with Orthopedics today.  Return to the emergency department for any other concerns.   Subjective:       Patient ID: Ida Hernández is a 56 y.o. female Body mass index is 23.84 kg/m².    Chief Complaint: Dysphagia    This patient is new to me.  Referring Provider: Noy Coyle for dysphagia.     GI Problem  Primary symptoms do not include fever, weight loss, fatigue, abdominal pain, nausea, vomiting, diarrhea, melena, hematemesis, jaundice, hematochezia or dysuria.   The illness is also significant for dysphagia (CHIEF COMPLAINT: Started 3 months ago & occurs after swallowing solid foods and pills daily; feels as though items get stuck in throat requiring frequent swallowing or having to drink water to push items down; denies choking or use of Heimlich maneuver) and bloating. The illness does not include chills, anorexia, odynophagia or constipation (Typically has 1 BM daily rated stool 4 on [place] scale). Significant associated medical issues include GERD (Reports heartburn occurs rarely about twice a year). Associated medical issues do not include inflammatory bowel disease, gallstones, liver disease, alcohol abuse, PUD, gastric bypass, bowel resection, irritable bowel syndrome, hemorrhoids or diverticulitis. Associated medical issues comments: History ; patient does report having colonoscopy about 10 years ago and history of colon polyps that were benign; family history of colon cancer in maternal grandmother.     Review of Systems   Constitutional:  Negative for activity change, appetite change, chills, diaphoresis, fatigue, fever, unexpected weight change and weight loss.   HENT:  Positive for trouble swallowing. Negative for sore throat.    Respiratory:  Negative for cough, choking and shortness of breath.    Cardiovascular:  Negative for chest pain.   Gastrointestinal:  Positive for bloating and dysphagia (CHIEF COMPLAINT: Started 3 months ago & occurs after swallowing solid foods and pills daily; feels as though items get stuck in throat requiring frequent swallowing or having to drink  "water to push items down; denies choking or use of Heimlich maneuver). Negative for abdominal distention, abdominal pain, anal bleeding, anorexia, blood in stool, constipation (Typically has 1 BM daily rated stool 4 on [place] scale), diarrhea, hematemesis, hematochezia, jaundice, melena, nausea, rectal pain and vomiting.   Genitourinary:  Negative for dysuria.       Patient's last menstrual period was 2013.  Past Medical History:   Diagnosis Date    Abnormal Pap smear     "Scraping" years ago. Normal resutls.    Colon polyp     Hypertension     Lumbar disc disease     Mood disorder      Past Surgical History:   Procedure Laterality Date     SECTION      COLONOSCOPY          DILATION AND CURETTAGE OF UTERUS  2004    Miscarriage    ENDOMETRIAL ABLATION  2006    INSERTION OF BREAST IMPLANT Bilateral 2005    Silicone implants.     Family History   Problem Relation Age of Onset    Thyroid disease Mother     Colon polyps Mother     Hypertension Mother     Coronary artery disease Father     Colon cancer Maternal Grandmother     Breast cancer Neg Hx     Ovarian cancer Neg Hx     Esophageal cancer Neg Hx     Stomach cancer Neg Hx      Social History     Tobacco Use    Smoking status: Every Day     Current packs/day: 0.00     Average packs/day: 1 pack/day for 20.0 years (20.0 ttl pk-yrs)     Types: Vaping with nicotine, Cigarettes     Start date: 2022     Last attempt to quit: 2022     Years since quittin.7     Passive exposure: Never    Smokeless tobacco: Never   Substance Use Topics    Alcohol use: Yes     Alcohol/week: 6.0 standard drinks of alcohol     Types: 4 Glasses of wine, 2 Cans of beer per week    Drug use: No     Wt Readings from Last 10 Encounters:   08/15/23 63 kg (138 lb 14.2 oz)   23 62.7 kg (138 lb 3.7 oz)   23 63.5 kg (140 lb)   23 64.9 kg (143 lb 1.3 oz)   23 62.9 kg (138 lb 10.7 oz)   22 66 kg (145 lb 8.1 oz)   22 66 kg (145 lb 8.1 oz) "   12/10/21 66 kg (145 lb 8.1 oz)   04/25/20 67.2 kg (148 lb 2.4 oz)   09/18/19 66.1 kg (145 lb 11.6 oz)     Lab Results   Component Value Date    WBC 4.51 06/21/2023    HGB 13.5 06/21/2023    HCT 41.5 06/21/2023    MCV 96 06/21/2023     06/21/2023     CMP  Sodium   Date Value Ref Range Status   06/21/2023 140 136 - 145 mmol/L Final     Potassium   Date Value Ref Range Status   06/21/2023 4.2 3.5 - 5.1 mmol/L Final     Chloride   Date Value Ref Range Status   06/21/2023 105 95 - 110 mmol/L Final     CO2   Date Value Ref Range Status   06/21/2023 25 23 - 29 mmol/L Final     Glucose   Date Value Ref Range Status   06/21/2023 118 (H) 70 - 110 mg/dL Final     BUN   Date Value Ref Range Status   06/21/2023 8 6 - 20 mg/dL Final     Creatinine   Date Value Ref Range Status   06/21/2023 0.8 0.5 - 1.4 mg/dL Final     Calcium   Date Value Ref Range Status   06/21/2023 10.1 8.7 - 10.5 mg/dL Final     Total Protein   Date Value Ref Range Status   06/21/2023 7.3 6.0 - 8.4 g/dL Final     Albumin   Date Value Ref Range Status   06/21/2023 4.4 3.5 - 5.2 g/dL Final     Total Bilirubin   Date Value Ref Range Status   06/21/2023 0.8 0.1 - 1.0 mg/dL Final     Comment:     For infants and newborns, interpretation of results should be based  on gestational age, weight and in agreement with clinical  observations.    Premature Infant recommended reference ranges:  Up to 24 hours.............<8.0 mg/dL  Up to 48 hours............<12.0 mg/dL  3-5 days..................<15.0 mg/dL  6-29 days.................<15.0 mg/dL       Alkaline Phosphatase   Date Value Ref Range Status   06/21/2023 53 (L) 55 - 135 U/L Final     AST   Date Value Ref Range Status   06/21/2023 22 10 - 40 U/L Final     ALT   Date Value Ref Range Status   06/21/2023 26 10 - 44 U/L Final     Anion Gap   Date Value Ref Range Status   06/21/2023 10 8 - 16 mmol/L Final     eGFR if    Date Value Ref Range Status   12/10/2021 >60.0 >60 mL/min/1.73 m^2 Final      eGFR if non    Date Value Ref Range Status   12/10/2021 >60.0 >60 mL/min/1.73 m^2 Final     Comment:     Calculation used to obtain the estimated glomerular filtration  rate (eGFR) is the CKD-EPI equation.        Lab Results   Component Value Date    TSH 0.850 06/21/2023     Reviewed prior medical records including radiology report of PET-CT 04/13/2022 & endoscopy history (see surgical history).    Objective:      Physical Exam  Vitals and nursing note reviewed.   Constitutional:       General: She is not in acute distress.     Appearance: Normal appearance. She is normal weight. She is not ill-appearing.   HENT:      Mouth/Throat:      Lips: Pink. No lesions.   Cardiovascular:      Rate and Rhythm: Normal rate.      Pulses: Normal pulses.      Heart sounds: Normal heart sounds.   Pulmonary:      Effort: Pulmonary effort is normal. No respiratory distress.      Breath sounds: Normal breath sounds.   Abdominal:      General: Abdomen is flat. Bowel sounds are normal. There is no distension or abdominal bruit. There are no signs of injury.      Palpations: Abdomen is soft. There is no shifting dullness, fluid wave, hepatomegaly, splenomegaly or mass.      Tenderness: There is no abdominal tenderness. There is no guarding or rebound. Negative signs include Santacruz's sign, Rovsing's sign and McBurney's sign.   Skin:     General: Skin is warm and dry.      Coloration: Skin is not jaundiced or pale.   Neurological:      Mental Status: She is alert and oriented to person, place, and time.   Psychiatric:         Attention and Perception: Attention normal.         Mood and Affect: Mood normal.         Speech: Speech normal.         Behavior: Behavior normal.         Assessment:       1. Dysphagia, unspecified type    2. Heartburn    3. Abdominal bloating    4. History of colon polyps    5. Family history of colon cancer        Plan:       Dysphagia, unspecified type  - schedule EGD, discussed procedure with  patient and possible esophageal dilation may be performed during procedure if indicated, patient verbalized understanding  - educated patient to eat smaller more frequent meals and to eat slowly and advised to eat a soft diet.  - possible UGI/esophagram/esophageal manometry if symptoms persist  - START: pantoprazole (PROTONIX) 20 MG tablet; Take 1 tablet (20 mg total) by mouth once daily.  Dispense: 30 tablet; Refill: 2  -     Case Request Endoscopy: EGD (ESOPHAGOGASTRODUODENOSCOPY)    Heartburn  - schedule EGD, discussed procedure with patient, including risks and benefits, patient verbalized understanding  -Avoid large meals, avoid eating within 2-3 hours of bedtime (avoid late night eating & lying down soon after eating), elevate head of bed if nocturnal symptoms are present, smoking cessation (if current smoker), & weight loss (if overweight).   -Avoid known foods which trigger reflux symptoms & to minimize/avoid high-fat foods, chocolate, caffeine, citrus, alcohol, & tomato products.  -Avoid/limit use of NSAID's, since they can cause GI upset, bleeding, and/or ulcers. If needed, take with food.  - START: pantoprazole (PROTONIX) 20 MG tablet; Take 1 tablet (20 mg total) by mouth once daily.  Dispense: 30 tablet; Refill: 2  -     Case Request Endoscopy: EGD (ESOPHAGOGASTRODUODENOSCOPY)    Abdominal bloating  - schedule EGD, discussed procedure with patient, including risks and benefits, patient verbalized understanding  - testing for H. Pylori typically performed during EGD  - recommend OTC simethicone as directed, such as Phazyme or Gas-x  - recommend low gas diet: Reduce or eliminate these foods from your diet: Broccoli, Cauliflower, [place] sprouts, Cabbage, Cooked dried beans, Carbonated beverages (sparkling water, soda, beer, champagne)  Other Causes Of Excess Gas Include:   1) EATING TOO FAST or TALKING WHILE YOU CHEW may cause you to swallow air. This increases the amount of gas in the stomach and may  worsen your symptoms.  --> Chew each mouthful completely before swallowing. Take your time.  2) OVEREATING may increase the feeling of being bloated and cause more gas.  --> When you are full, stop eating.  3) CONSTIPATION can increase the amount of normal intestinal gas.  --> Avoid constipation by increasing the amount of fiber in your diet by including whole cereal grains, fresh vegetables (except those in the above list) and fresh fruits. High-fiber foods absorb water and carry it out of the body. When increasing the amount of fiber in your diet, you also need to increase the amount of water that you drink. You should drink at least eight 8-ounce glasses of water (two quarts) per day.  -     Case Request Endoscopy: EGD (ESOPHAGOGASTRODUODENOSCOPY)    History of colon polyps  - schedule Colonoscopy, discussed procedure with the patient, including risks and benefits, patient verbalized understanding  -     Case Request Endoscopy: COLONOSCOPY    Family history of colon cancer  - schedule Colonoscopy, discussed procedure with the patient, including risks and benefits, patient verbalized understanding  -     Case Request Endoscopy: COLONOSCOPY      Follow up in about 4 weeks (around 9/12/2023), or if symptoms worsen or fail to improve.      If no improvement in symptoms or symptoms worsen, call/follow-up at clinic or go to ER.        45 minutes of total time spent on the encounter, which includes face to face time and non-face to face time preparing to see the patient (eg, review of tests), Obtaining and/or reviewing separately obtained history, Documenting clinical information in the electronic or other health record, Independently interpreting results (not separately reported) and communicating results to the patient/family/caregiver, or Care coordination (not separately reported).     A dictation software program was used for this note. Please expect some simple typographical  errors in this note.

## 2024-02-20 PROBLEM — T88.3XXA MALIGNANT HYPERTHERMIA: Status: ACTIVE | Noted: 2024-02-20

## 2024-02-20 PROBLEM — G37.9 DEMYELINATING DISEASE OF CENTRAL NERVOUS SYSTEM: Status: ACTIVE | Noted: 2021-07-06

## 2024-02-20 PROBLEM — G47.00 INSOMNIA: Status: ACTIVE | Noted: 2022-11-14

## 2024-02-20 PROBLEM — G89.4 CHRONIC PAIN SYNDROME: Status: ACTIVE | Noted: 2024-02-20

## 2024-02-20 PROBLEM — K31.1 PYLORIC STENOSIS: Status: ACTIVE | Noted: 2024-02-20

## 2024-02-20 PROBLEM — F32.A DEPRESSION: Status: ACTIVE | Noted: 2021-07-06

## 2024-02-20 PROBLEM — M54.10 RADICULOPATHY: Status: ACTIVE | Noted: 2024-02-20

## 2024-02-20 PROBLEM — M47.816 FACET ARTHRITIS, DEGENERATIVE, LUMBAR SPINE: Status: ACTIVE | Noted: 2024-02-20

## 2024-02-20 PROBLEM — K31.1 PYLORIC STENOSIS: Status: RESOLVED | Noted: 2024-02-20 | Resolved: 2024-02-20

## 2024-02-20 PROBLEM — M85.80 OSTEOPENIA: Status: ACTIVE | Noted: 2024-02-20

## 2024-02-20 PROBLEM — J30.2 SEASONAL ALLERGIES: Status: ACTIVE | Noted: 2024-02-20

## 2024-02-20 PROBLEM — R13.10 DYSPHAGIA: Status: ACTIVE | Noted: 2024-02-20

## 2024-02-21 PROBLEM — T88.3XXA MALIGNANT HYPERTHERMIA: Status: RESOLVED | Noted: 2024-02-20 | Resolved: 2024-02-21

## 2024-07-22 PROBLEM — Z3A.36 36 WEEKS GESTATION OF PREGNANCY: Status: RESOLVED | Noted: 2023-08-11 | Resolved: 2024-07-22

## 2024-09-23 ENCOUNTER — HOSPITAL ENCOUNTER (EMERGENCY)
Facility: HOSPITAL | Age: 34
Discharge: HOME | End: 2024-09-23
Attending: EMERGENCY MEDICINE
Payer: MEDICAID

## 2024-09-23 VITALS
HEART RATE: 60 BPM | TEMPERATURE: 99 F | SYSTOLIC BLOOD PRESSURE: 99 MMHG | DIASTOLIC BLOOD PRESSURE: 68 MMHG | RESPIRATION RATE: 17 BRPM | OXYGEN SATURATION: 98 % | HEIGHT: 64 IN | WEIGHT: 116 LBS | BODY MASS INDEX: 19.81 KG/M2

## 2024-09-23 DIAGNOSIS — G43.809 OTHER MIGRAINE WITHOUT STATUS MIGRAINOSUS, NOT INTRACTABLE: Primary | ICD-10-CM

## 2024-09-23 PROCEDURE — 63600175 PHARM REV CODE 636 W HCPCS: Performed by: EMERGENCY MEDICINE

## 2024-09-23 PROCEDURE — 25000003 PHARM REV CODE 250: Performed by: EMERGENCY MEDICINE

## 2024-09-23 PROCEDURE — 96372 THER/PROPH/DIAG INJ SC/IM: CPT | Performed by: EMERGENCY MEDICINE

## 2024-09-23 PROCEDURE — 99284 EMERGENCY DEPT VISIT MOD MDM: CPT | Mod: 25

## 2024-09-23 RX ORDER — BUTALBITAL, ACETAMINOPHEN AND CAFFEINE 50; 325; 40 MG/1; MG/1; MG/1
1 TABLET ORAL
Status: COMPLETED | OUTPATIENT
Start: 2024-09-23 | End: 2024-09-23

## 2024-09-23 RX ORDER — DIPHENHYDRAMINE HYDROCHLORIDE 50 MG/ML
25 INJECTION INTRAMUSCULAR; INTRAVENOUS
Status: COMPLETED | OUTPATIENT
Start: 2024-09-23 | End: 2024-09-23

## 2024-09-23 RX ORDER — METOCLOPRAMIDE HYDROCHLORIDE 5 MG/ML
10 INJECTION INTRAMUSCULAR; INTRAVENOUS
Status: COMPLETED | OUTPATIENT
Start: 2024-09-23 | End: 2024-09-23

## 2024-09-23 RX ADMIN — BUTALBITAL, ACETAMINOPHEN, AND CAFFEINE 1 TABLET: 325; 50; 40 TABLET ORAL at 09:09

## 2024-09-23 RX ADMIN — METOCLOPRAMIDE 10 MG: 5 INJECTION, SOLUTION INTRAMUSCULAR; INTRAVENOUS at 09:09

## 2024-09-23 RX ADMIN — DIPHENHYDRAMINE HYDROCHLORIDE 25 MG: 50 INJECTION INTRAMUSCULAR; INTRAVENOUS at 09:09

## 2024-09-24 NOTE — ED PROVIDER NOTES
EMERGENCY DEPARTMENT HISTORY AND PHYSICAL EXAM     This note is dictated on M*Modal word recognition program.  There are word recognition mistakes and grammatical errors that are occasionally missed on review.     Date: 9/23/2024   Patient Name: Seema Myrick       History of Presenting Illness      Chief Complaint   Patient presents with    Headache     Chronic migraine x 4 days.         2200   Seema Myrick is a 34 y.o. female with PMHX of migraines, chronic pain, small bowel obstruction who presents to the emergency department C/O headache.      Patient reports left temporal headache.  Feels like prior migraines.  Has been ongoing for the past 4 days.  Exacerbated by via noises in bright light.  Patient states she was tried Tylenol which does not help.  Gets Botox injections for this problem.  Patient reports still only thing that has helped in the past is strong pain medication.    PCP: Karyna White, NP        No current facility-administered medications for this encounter.     Current Outpatient Medications   Medication Sig Dispense Refill    albuterol (PROVENTIL/VENTOLIN HFA) 90 mcg/actuation inhaler INHALE 2 PUFFS BY MOUTH EVERY 6 HOURS AS NEEDED FOR WHEEZING OR SHORTNESS OF BREATH 18 g 11    atogepant (QULIPTA) 60 mg Tab Take 60 mg by mouth.      azithromycin (Z-TERESA) 250 MG tablet Take 500 mg by mouth once daily.      BABY ASPIRIN ORAL Take 81 mg by mouth. TAKES ON MONDAYS - WEDNESDAYS - FRIDAYS      butalbital-acetaminophen-caffeine -40 mg (FIORICET, ESGIC) -40 mg per tablet Take 1 tablet by mouth every 4 (four) hours as needed for Pain.      clopidogreL (PLAVIX) 75 mg tablet Take 75 mg by mouth once daily.      diazePAM (VALIUM) 5 MG tablet Take 5 mg by mouth every 6 (six) hours as needed for Anxiety.      diphenhydrAMINE (BENADRYL ALLERGY) 25 mg tablet Take 1 tablet (25 mg total) by mouth every 6 (six) hours as needed for Allergies (rash). 4 tablet 0    famotidine (PEPCID) 20 MG  tablet TAKE 1 TABLET(20 MG) BY MOUTH TWICE DAILY AS NEEDED FOR HEARTBURN 60 tablet 3    HYDROcodone-acetaminophen (NORCO) 5-325 mg per tablet Take 2 tablets by mouth every 6 (six) hours as needed. 15 tablet 0    meclizine (ANTIVERT) 25 mg tablet TAKE 1 TABLET(25 MG) BY MOUTH THREE TIMES DAILY AS NEEDED FOR DIZZINESS 90 tablet 5    metoprolol succinate (TOPROL-XL) 50 MG 24 hr tablet metoprolol succinate ER 50 mg tablet,extended release 24 hr, [RxNorm: 530389]      naproxen (NAPROSYN) 500 MG tablet Take 1 tablet (500 mg total) by mouth 2 (two) times daily with meals. 30 tablet 0    omeprazole (PRILOSEC) 40 MG capsule Take 1 capsule (40 mg total) by mouth once daily. 30 capsule 2    oxybutynin (DITROPAN-XL) 5 MG TR24 Take 1 tablet (5 mg total) by mouth once daily. 30 tablet 0    oxyCODONE-acetaminophen (PERCOCET) 5-325 mg per tablet Take 1 tablet by mouth every 4 (four) hours as needed for Pain. 6 tablet 0    promethazine (PHENERGAN) 25 MG tablet Take 1 tablet (25 mg total) by mouth every 6 (six) hours as needed for Nausea. 15 tablet 0    tamsulosin (FLOMAX) 0.4 mg Cap Take 1 capsule (0.4 mg total) by mouth once daily. 30 capsule 0           Past History     Past Medical History:   Past Medical History:   Diagnosis Date    Anemia     Anxiety     Arthritis     states to lower back from falling in     Asthma     Blood in stool 2022    Carpal tunnel syndrome     Carpal tunnel syndrome on right     EMERITA (cerebral atherosclerosis)     Demyelinating disease of central nervous system     Depression     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    Fibrosis, breast     Gallstones     GERD (gastroesophageal reflux disease)     History of chest pain     IBS (irritable bowel syndrome)     Insomnia     Kidney stones     Malignant hyperthermia     age2    Migraine headache     N&V (nausea and vomiting) 2022    Osteoporosis     PFO (patent foramen ovale)     not flowing correctly (size 5)    Pregnant          PTSD (post-traumatic stress disorder)     Small intestine disorder     twisted    T11 vertebral fracture     Thyroid disease         Past Surgical History:   Past Surgical History:   Procedure Laterality Date    ADENOIDECTOMY      ARTHROSCOPY OF KNEE Left 08/07/2020    Procedure: ARTHROSCOPY, KNEE;  Surgeon: Mal Ayala MD;  Location: On license of [facility];  Service: Orthopedics;  Laterality: Left;    CARPAL TUNNEL RELEASE Right 10/29/2019    Procedure: RELEASE, CARPAL TUNNEL;  Surgeon: Mal Ayala MD;  Location: On license of [facility];  Service: Orthopedics;  Laterality: Right;    CHOLECYSTECTOMY      CLOSURE, PFO      CYSTOSCOPY W/ URETERAL STENT REMOVAL Left 3/18/2024    Procedure: CYSTOSCOPY, WITH URETERAL STENT REMOVAL;  Surgeon: Rob Posadas II, MD;  Location: On license of [facility];  Service: Urology;  Laterality: Left;    CYSTOSCOPY WITH URETEROSCOPY, RETROGRADE PYELOGRAPHY, AND INSERTION OF STENT Left 02/21/2024    Procedure: CYSTOSCOPY, WITH RETROGRADE PYELOGRAM AND URETERAL STENT INSERTION;  Surgeon: Rob Posadas II, MD;  Location: On license of [facility];  Service: Urology;  Laterality: Left;    EGD - EXTERNAL RESULT      ESOPHAGOGASTRODUODENOSCOPY N/A 12/05/2019    Procedure: EGD (ESOPHAGOGASTRODUODENOSCOPY);  Surgeon: Claudio Enriquez MD;  [facility];  Service: Endoscopy;  Laterality: N/A;    EXTRACORPOREAL SHOCK WAVE LITHOTRIPSY Left 3/18/2024    Procedure: LITHOTRIPSY, ESWL;  Surgeon: Rob Posadas II, MD;  Location: On license of [facility];  Service: Urology;  Laterality: Left;    injections to neck      LITHOTRIPSY      TONSILLECTOMY      TYMPANOSTOMY TUBE PLACEMENT          Family History:   Family History   Problem Relation Name Age of Onset    Thyroid disease Mother      Carpal tunnel syndrome Mother      Cholecystitis Father      Kidney disease Father      Gout Father      No Known Problems Paternal Grandmother      No Known Problems Paternal Grandfather      No Known Problems Maternal Grandmother      Brain cancer Maternal Grandfather    "   Brain cancer Paternal Aunt      No Known Problems Sister          Social History:   Social History     Tobacco Use    Smoking status: Never    Smokeless tobacco: Never   Substance Use Topics    Alcohol use: No    Drug use: Yes     Types: Marijuana     Comment: "I am on medical marijuana for pain management, but I'm out right now."        Allergies:   Review of patient's allergies indicates:   Allergen Reactions    Halothane Other (See Comments)     Malignant Hyperthermia    Mucinex [guaifenesin] Other (See Comments)     Asthma attack     Succinylcholine Other (See Comments)     Malignant Hyperthermia    Adhesive tape-silicones      RASH    ONLY USE BROWN WRAP TAPE    Amoxil [amoxicillin] Other (See Comments)     Makes my stomach hurt    Bactrim [sulfamethoxazole-trimethoprim] Nausea And Vomiting    Compazine [prochlorperazine edisylate]     Doxycycline     Neurontin [gabapentin] Other (See Comments)     Patient reports if makes her whole body numb    Pseudoephedrine     Succinylcholine chloride      Other reaction(s): Unknown    Toradol [ketorolac]      MIGRAINES    Tramadol      MIGRAINES    Trintellix [vortioxetine]      "PASS OUT AND CHEST PAIN"    Zofran [ondansetron hcl (pf)] Other (See Comments)     States: "It gives me migraines"       Brompheniramine-pseudoeph-dm Rash     Other reaction(s): Unknown  Other reaction(s): Unknown    Capzasin [capsaicin] Rash    Levaquin [levofloxacin] Rash    Trimethadione/paramethadione Rash          Review of Systems   Review of Systems   See HPI for pertinent positives and negatives       Physical Exam     Vitals:    09/23/24 2023 09/23/24 2024   BP:  112/81   Pulse:  89   Resp:  17   Temp:  98.3 °F (36.8 °C)   SpO2:  99%   Weight: 52.6 kg (116 lb)    Height: 5' 4" (1.626 m)       Physical Exam  Vitals and nursing note reviewed.   Constitutional:       General: She is not in acute distress.     Appearance: Normal appearance. She is not ill-appearing.      Comments: " Well-appearing female sitting up awake alert eyes open in bright room   HENT:      Head: Normocephalic and atraumatic.      Right Ear: External ear normal.      Left Ear: External ear normal.      Nose: Nose normal. No congestion or rhinorrhea.      Mouth/Throat:      Mouth: Mucous membranes are moist.   Eyes:      Conjunctiva/sclera: Conjunctivae normal.      Pupils: Pupils are equal, round, and reactive to light.   Pulmonary:      Effort: Pulmonary effort is normal. No respiratory distress.   Musculoskeletal:         General: No deformity. Normal range of motion.      Cervical back: Normal range of motion. No rigidity.   Skin:     General: Skin is dry.   Neurological:      General: No focal deficit present.      Mental Status: She is alert and oriented to person, place, and time. Mental status is at baseline.   Psychiatric:         Mood and Affect: Mood normal.         Behavior: Behavior normal.              Diagnostic Study Results      Labs -   No results found for this or any previous visit (from the past 12 hour(s)).     Radiologic Studies -    No orders to display        Medications given in the ED-   Medications   metoclopramide injection 10 mg (10 mg Intramuscular Given 9/23/24 2137)   diphenhydrAMINE injection 25 mg (25 mg Intramuscular Given 9/23/24 2137)   butalbital-acetaminophen-caffeine -40 mg per tablet 1 tablet (1 tablet Oral Given 9/23/24 2137)           Medical Decision Making    I am the first provider for this patient.     I reviewed the vital signs, available nursing notes, past medical history, past surgical history, family history and social history.     Vital Signs:  Reviewed the patient's vital signs.     Pulse Oximetry Analysis and Interpretation:    99% on Room Air, normal        External Test Results (Pertinent to encounter):    Records Reviewed: Nursing Notes, Current Prescription Medications, Old Medical Records, and External Medical Records     History Obtained By:  Patient    Provider Notes: Seema Myrick is a 34 y.o. female with headache    Co-morbidities Considered:  Multiple medication allergies, chronic pain    Differential Diagnosis:  Headache, migraine headache      ED Course:    No red flag symptoms for headache.  Discussed symptom management.  Treat symptomatically in ED.  Advised follow-up patient's primary care provider.         Problems Addressed:  Headache    Procedures:   Procedures       Diagnosis and Disposition     Critical Care:      DISCHARGE NOTE:       Seema Myrick's  results have been reviewed with her.  She has been counseled regarding her diagnosis, treatment, and plan.  She verbally conveys understanding and agreement of the signs, symptoms, diagnosis, treatment and prognosis and additionally agrees to follow up as discussed.  She also agrees with the care-plan and conveys that all of her questions have been answered.  I have also provided discharge instructions for her that include: educational information regarding their diagnosis and treatment, and list of reasons why they would want to return to the ED prior to their follow-up appointment, should her condition change. She has been provided with education for proper emergency department utilization.         CLINICAL IMPRESSION:         1. Other migraine without status migrainosus, not intractable              PLAN:   1. Discharge Home  2.      Medication List        ASK your doctor about these medications      albuterol 90 mcg/actuation inhaler  Commonly known as: PROVENTIL/VENTOLIN HFA  INHALE 2 PUFFS BY MOUTH EVERY 6 HOURS AS NEEDED FOR WHEEZING OR SHORTNESS OF BREATH     azithromycin 250 MG tablet  Commonly known as: Z-TERESA     BABY ASPIRIN ORAL     butalbital-acetaminophen-caffeine -40 mg -40 mg per tablet  Commonly known as: FIORICET, ESGIC     clopidogreL 75 mg tablet  Commonly known as: PLAVIX     diazePAM 5 MG tablet  Commonly known as: VALIUM     diphenhydrAMINE 25 mg  tablet  Commonly known as: BENADRYL ALLERGY  Take 1 tablet (25 mg total) by mouth every 6 (six) hours as needed for Allergies (rash).     famotidine 20 MG tablet  Commonly known as: PEPCID  TAKE 1 TABLET(20 MG) BY MOUTH TWICE DAILY AS NEEDED FOR HEARTBURN     HYDROcodone-acetaminophen 5-325 mg per tablet  Commonly known as: NORCO  Take 2 tablets by mouth every 6 (six) hours as needed.     meclizine 25 mg tablet  Commonly known as: ANTIVERT  TAKE 1 TABLET(25 MG) BY MOUTH THREE TIMES DAILY AS NEEDED FOR DIZZINESS     metoprolol succinate 50 MG 24 hr tablet  Commonly known as: TOPROL-XL     naproxen 500 MG tablet  Commonly known as: NAPROSYN  Take 1 tablet (500 mg total) by mouth 2 (two) times daily with meals.     omeprazole 40 MG capsule  Commonly known as: PRILOSEC  Take 1 capsule (40 mg total) by mouth once daily.     oxybutynin 5 MG Tr24  Commonly known as: DITROPAN-XL  Take 1 tablet (5 mg total) by mouth once daily.     oxyCODONE-acetaminophen 5-325 mg per tablet  Commonly known as: PERCOCET  Take 1 tablet by mouth every 4 (four) hours as needed for Pain.     promethazine 25 MG tablet  Commonly known as: PHENERGAN  Take 1 tablet (25 mg total) by mouth every 6 (six) hours as needed for Nausea.     QULIPTA 60 mg Tab  Generic drug: atogepant     tamsulosin 0.4 mg Cap  Commonly known as: FLOMAX  Take 1 capsule (0.4 mg total) by mouth once daily.             3. Karyna White, NP  [address] [facility] [address]  [phone]    Schedule an appointment as soon as possible for a visit   As needed       _______________________________     Please note that this dictation was completed with [employer], the computer voice recognition software.  Quite often unanticipated grammatical, syntax, homophones, and other interpretive errors are inadvertently transcribed by the computer software.  Please disregard these errors.  Please excuse any errors that have escaped final proofreading.             Niles Pettit,  MD  09/24/24 0552

## (undated) DEVICE — LINER SUCTION CANNISTER REGUGA